# Patient Record
Sex: FEMALE | Race: WHITE | Employment: UNEMPLOYED | URBAN - METROPOLITAN AREA
[De-identification: names, ages, dates, MRNs, and addresses within clinical notes are randomized per-mention and may not be internally consistent; named-entity substitution may affect disease eponyms.]

---

## 2018-01-08 ENCOUNTER — OFFICE VISIT - HEALTHEAST (OUTPATIENT)
Dept: FAMILY MEDICINE | Facility: CLINIC | Age: 28
End: 2018-01-08

## 2018-01-08 DIAGNOSIS — T36.95XA ANTIBIOTIC-INDUCED YEAST INFECTION: ICD-10-CM

## 2018-01-08 DIAGNOSIS — J06.9 VIRAL URI WITH COUGH: ICD-10-CM

## 2018-01-08 DIAGNOSIS — H65.92 LEFT OTITIS MEDIA WITH EFFUSION: ICD-10-CM

## 2018-01-08 DIAGNOSIS — B37.9 ANTIBIOTIC-INDUCED YEAST INFECTION: ICD-10-CM

## 2018-01-31 ENCOUNTER — OFFICE VISIT - HEALTHEAST (OUTPATIENT)
Dept: FAMILY MEDICINE | Facility: CLINIC | Age: 28
End: 2018-01-31

## 2018-01-31 DIAGNOSIS — R07.0 THROAT PAIN: ICD-10-CM

## 2018-01-31 DIAGNOSIS — J06.9 URI (UPPER RESPIRATORY INFECTION): ICD-10-CM

## 2018-01-31 DIAGNOSIS — H69.91 DYSFUNCTION OF RIGHT EUSTACHIAN TUBE: ICD-10-CM

## 2018-01-31 LAB — DEPRECATED S PYO AG THROAT QL EIA: NORMAL

## 2018-02-01 LAB — GROUP A STREP BY PCR: NORMAL

## 2018-03-02 ENCOUNTER — OFFICE VISIT - HEALTHEAST (OUTPATIENT)
Dept: FAMILY MEDICINE | Facility: CLINIC | Age: 28
End: 2018-03-02

## 2018-03-02 DIAGNOSIS — R05.9 COUGH: ICD-10-CM

## 2018-03-02 DIAGNOSIS — R50.9 FEVER: ICD-10-CM

## 2018-03-02 LAB — DEPRECATED S PYO AG THROAT QL EIA: NORMAL

## 2018-03-03 LAB — GROUP A STREP BY PCR: NORMAL

## 2018-04-04 ENCOUNTER — OFFICE VISIT - HEALTHEAST (OUTPATIENT)
Dept: FAMILY MEDICINE | Facility: CLINIC | Age: 28
End: 2018-04-04

## 2018-04-04 DIAGNOSIS — N89.8 VAGINAL DISCHARGE: ICD-10-CM

## 2018-04-04 LAB
ALBUMIN UR-MCNC: NEGATIVE MG/DL
APPEARANCE UR: CLEAR
BILIRUB UR QL STRIP: NEGATIVE
CLUE CELLS: ABNORMAL
COLOR UR AUTO: YELLOW
GLUCOSE UR STRIP-MCNC: NEGATIVE MG/DL
HCG UR QL: NEGATIVE
HGB UR QL STRIP: NEGATIVE
KETONES UR STRIP-MCNC: NEGATIVE MG/DL
LEUKOCYTE ESTERASE UR QL STRIP: NEGATIVE
NITRATE UR QL: NEGATIVE
PH UR STRIP: 7 [PH] (ref 5–8)
SP GR UR STRIP: 1.02 (ref 1–1.03)
SP GR UR STRIP: 1.02 (ref 1–1.03)
TRICHOMONAS, WET PREP: ABNORMAL
UROBILINOGEN UR STRIP-ACNC: NORMAL
YEAST, WET PREP: ABNORMAL

## 2018-09-19 ENCOUNTER — OFFICE VISIT - HEALTHEAST (OUTPATIENT)
Dept: FAMILY MEDICINE | Facility: CLINIC | Age: 28
End: 2018-09-19

## 2018-09-19 DIAGNOSIS — J02.9 SORE THROAT: ICD-10-CM

## 2018-09-19 DIAGNOSIS — N89.8 VAGINAL DISCHARGE: ICD-10-CM

## 2018-09-19 LAB — DEPRECATED S PYO AG THROAT QL EIA: NORMAL

## 2018-09-20 LAB — GROUP A STREP BY PCR: NORMAL

## 2018-10-17 ENCOUNTER — AMBULATORY - HEALTHEAST (OUTPATIENT)
Dept: NURSING | Facility: CLINIC | Age: 28
End: 2018-10-17

## 2018-11-21 ENCOUNTER — OFFICE VISIT - HEALTHEAST (OUTPATIENT)
Dept: FAMILY MEDICINE | Facility: CLINIC | Age: 28
End: 2018-11-21

## 2018-11-21 DIAGNOSIS — R53.83 FATIGUE, UNSPECIFIED TYPE: ICD-10-CM

## 2018-11-21 DIAGNOSIS — R52 GENERALIZED BODY ACHES: ICD-10-CM

## 2018-11-21 DIAGNOSIS — N89.8 VAGINAL DISCHARGE: ICD-10-CM

## 2018-11-21 DIAGNOSIS — B37.31 VAGINAL CANDIDIASIS: ICD-10-CM

## 2018-11-21 LAB
BASOPHILS # BLD AUTO: 0.1 THOU/UL (ref 0–0.2)
BASOPHILS NFR BLD AUTO: 1 % (ref 0–2)
CLUE CELLS: ABNORMAL
EOSINOPHIL # BLD AUTO: 0.2 THOU/UL (ref 0–0.4)
EOSINOPHIL NFR BLD AUTO: 2 % (ref 0–6)
ERYTHROCYTE [DISTWIDTH] IN BLOOD BY AUTOMATED COUNT: 11.4 % (ref 11–14.5)
ERYTHROCYTE [SEDIMENTATION RATE] IN BLOOD BY WESTERGREN METHOD: 8 MM/HR (ref 0–20)
HCT VFR BLD AUTO: 42.6 % (ref 35–47)
HGB BLD-MCNC: 14.7 G/DL (ref 12–16)
LYMPHOCYTES # BLD AUTO: 2.3 THOU/UL (ref 0.8–4.4)
LYMPHOCYTES NFR BLD AUTO: 28 % (ref 20–40)
MCH RBC QN AUTO: 31.2 PG (ref 27–34)
MCHC RBC AUTO-ENTMCNC: 34.4 G/DL (ref 32–36)
MCV RBC AUTO: 91 FL (ref 80–100)
MONOCYTES # BLD AUTO: 0.4 THOU/UL (ref 0–0.9)
MONOCYTES NFR BLD AUTO: 5 % (ref 2–10)
NEUTROPHILS # BLD AUTO: 5.1 THOU/UL (ref 2–7.7)
NEUTROPHILS NFR BLD AUTO: 63 % (ref 50–70)
PLATELET # BLD AUTO: 232 THOU/UL (ref 140–440)
PMV BLD AUTO: 8.1 FL (ref 7–10)
RBC # BLD AUTO: 4.71 MILL/UL (ref 3.8–5.4)
TRICHOMONAS, WET PREP: ABNORMAL
TSH SERPL DL<=0.005 MIU/L-ACNC: 2.09 UIU/ML (ref 0.3–5)
WBC: 8.1 THOU/UL (ref 4–11)
YEAST, WET PREP: ABNORMAL

## 2018-11-22 ENCOUNTER — AMBULATORY - HEALTHEAST (OUTPATIENT)
Dept: FAMILY MEDICINE | Facility: CLINIC | Age: 28
End: 2018-11-22

## 2018-11-22 DIAGNOSIS — E55.9 VITAMIN D DEFICIENCY: ICD-10-CM

## 2018-11-22 LAB
25(OH)D3 SERPL-MCNC: 13.7 NG/ML (ref 30–80)
25(OH)D3 SERPL-MCNC: 13.7 NG/ML (ref 30–80)
B BURGDOR IGG+IGM SER QL: 0.09 INDEX VALUE

## 2019-05-21 ENCOUNTER — OFFICE VISIT - HEALTHEAST (OUTPATIENT)
Dept: FAMILY MEDICINE | Facility: CLINIC | Age: 29
End: 2019-05-21

## 2019-05-21 DIAGNOSIS — N76.0 BV (BACTERIAL VAGINOSIS): ICD-10-CM

## 2019-05-21 DIAGNOSIS — N89.8 VAGINAL DISCHARGE: ICD-10-CM

## 2019-05-21 DIAGNOSIS — R35.0 URINARY FREQUENCY: ICD-10-CM

## 2019-05-21 DIAGNOSIS — B96.89 BV (BACTERIAL VAGINOSIS): ICD-10-CM

## 2019-05-21 LAB
ALBUMIN UR-MCNC: NEGATIVE MG/DL
APPEARANCE UR: CLEAR
BILIRUB UR QL STRIP: NEGATIVE
CLUE CELLS: ABNORMAL
COLOR UR AUTO: YELLOW
GLUCOSE UR STRIP-MCNC: NEGATIVE MG/DL
HGB UR QL STRIP: NEGATIVE
KETONES UR STRIP-MCNC: NEGATIVE MG/DL
LEUKOCYTE ESTERASE UR QL STRIP: NEGATIVE
NITRATE UR QL: NEGATIVE
PH UR STRIP: 5.5 [PH] (ref 5–8)
SP GR UR STRIP: 1.02 (ref 1–1.03)
TRICHOMONAS, WET PREP: ABNORMAL
UROBILINOGEN UR STRIP-ACNC: NORMAL
YEAST, WET PREP: ABNORMAL

## 2019-07-30 ENCOUNTER — COMMUNICATION - HEALTHEAST (OUTPATIENT)
Dept: FAMILY MEDICINE | Facility: CLINIC | Age: 29
End: 2019-07-30

## 2019-07-30 DIAGNOSIS — B37.31 VAGINAL CANDIDIASIS: ICD-10-CM

## 2019-08-07 ENCOUNTER — OFFICE VISIT - HEALTHEAST (OUTPATIENT)
Dept: FAMILY MEDICINE | Facility: CLINIC | Age: 29
End: 2019-08-07

## 2019-08-07 DIAGNOSIS — R30.0 DYSURIA: ICD-10-CM

## 2019-08-07 DIAGNOSIS — M26.609 TMJ (TEMPOROMANDIBULAR JOINT SYNDROME): ICD-10-CM

## 2019-08-07 LAB
ALBUMIN UR-MCNC: NEGATIVE MG/DL
APPEARANCE UR: CLEAR
BILIRUB UR QL STRIP: NEGATIVE
COLOR UR AUTO: YELLOW
GLUCOSE UR STRIP-MCNC: NEGATIVE MG/DL
HGB UR QL STRIP: NEGATIVE
KETONES UR STRIP-MCNC: NEGATIVE MG/DL
LEUKOCYTE ESTERASE UR QL STRIP: NEGATIVE
NITRATE UR QL: NEGATIVE
PH UR STRIP: 6 [PH] (ref 5–8)
SP GR UR STRIP: 1.02 (ref 1–1.03)
UROBILINOGEN UR STRIP-ACNC: NORMAL

## 2019-08-07 ASSESSMENT — MIFFLIN-ST. JEOR: SCORE: 1606.3

## 2019-09-14 ENCOUNTER — OFFICE VISIT - HEALTHEAST (OUTPATIENT)
Dept: FAMILY MEDICINE | Facility: CLINIC | Age: 29
End: 2019-09-14

## 2019-09-14 DIAGNOSIS — N89.8 VAGINAL DISCHARGE: ICD-10-CM

## 2019-09-14 DIAGNOSIS — L02.31 ABSCESS, GLUTEAL CLEFT: ICD-10-CM

## 2019-09-14 LAB
CLUE CELLS: NORMAL
TRICHOMONAS, WET PREP: NORMAL
YEAST, WET PREP: NORMAL

## 2019-09-17 ENCOUNTER — AMBULATORY - HEALTHEAST (OUTPATIENT)
Dept: NURSING | Facility: CLINIC | Age: 29
End: 2019-09-17

## 2019-09-17 DIAGNOSIS — Z23 ENCOUNTER FOR IMMUNIZATION: ICD-10-CM

## 2019-09-19 ENCOUNTER — COMMUNICATION - HEALTHEAST (OUTPATIENT)
Dept: FAMILY MEDICINE | Facility: CLINIC | Age: 29
End: 2019-09-19

## 2019-09-19 DIAGNOSIS — Z87.42 HISTORY OF VAGINITIS: ICD-10-CM

## 2019-09-21 ENCOUNTER — COMMUNICATION - HEALTHEAST (OUTPATIENT)
Dept: SCHEDULING | Facility: CLINIC | Age: 29
End: 2019-09-21

## 2019-11-29 ENCOUNTER — COMMUNICATION - HEALTHEAST (OUTPATIENT)
Dept: FAMILY MEDICINE | Facility: CLINIC | Age: 29
End: 2019-11-29

## 2019-11-29 DIAGNOSIS — L02.31 ABSCESS, GLUTEAL CLEFT: ICD-10-CM

## 2020-01-13 ENCOUNTER — OFFICE VISIT - HEALTHEAST (OUTPATIENT)
Dept: FAMILY MEDICINE | Facility: CLINIC | Age: 30
End: 2020-01-13

## 2020-01-13 DIAGNOSIS — N89.8 VAGINAL DISCHARGE: ICD-10-CM

## 2020-01-13 DIAGNOSIS — R35.0 URINARY FREQUENCY: ICD-10-CM

## 2020-01-13 DIAGNOSIS — R30.0 DYSURIA: ICD-10-CM

## 2020-01-13 LAB
ALBUMIN UR-MCNC: NEGATIVE MG/DL
APPEARANCE UR: CLEAR
BACTERIA #/AREA URNS HPF: ABNORMAL HPF
BILIRUB UR QL STRIP: NEGATIVE
CLUE CELLS: NORMAL
COLOR UR AUTO: YELLOW
GLUCOSE UR STRIP-MCNC: NEGATIVE MG/DL
HGB UR QL STRIP: NEGATIVE
KETONES UR STRIP-MCNC: NEGATIVE MG/DL
LEUKOCYTE ESTERASE UR QL STRIP: NEGATIVE
MUCOUS THREADS #/AREA URNS LPF: ABNORMAL LPF
NITRATE UR QL: NEGATIVE
PH UR STRIP: 5.5 [PH] (ref 5–8)
RBC #/AREA URNS AUTO: ABNORMAL HPF
SP GR UR STRIP: >=1.03 (ref 1–1.03)
SQUAMOUS #/AREA URNS AUTO: ABNORMAL LPF
TRICHOMONAS, WET PREP: NORMAL
UROBILINOGEN UR STRIP-ACNC: ABNORMAL
WBC #/AREA URNS AUTO: ABNORMAL HPF
YEAST, WET PREP: NORMAL

## 2020-03-15 ENCOUNTER — NURSE TRIAGE (OUTPATIENT)
Dept: NURSING | Facility: CLINIC | Age: 30
End: 2020-03-15

## 2020-03-15 NOTE — TELEPHONE ENCOUNTER
Calling, wants to be tested for Coronavirus.  Pt states she has been at the  [employer], lives in [facility] and has been working as a .   Pt has two kids who have a weakened immune system and wants to be checked to be cautious.   RN advised she does not meet criteria for testing, did give [email or web address] as option.   Fever today- 101.5 1 hour ago and 104.5  +Head feels weird, feels like brain freeze, then burning sensation   +Moderate headache  +sore throat  +Stomach pain started today.   +cough    Symptoms began on Friday with a sore throat and cough, today the fever began and the head sensation.   Has been taking ibuprofen  Pt states she is a , when talking to customers she had to stop and had to fix what she was saying.  Denies being confused.   Harder to think or say what she wants to say.   [place] like she had a brain freeze, would last for about 5 minutes, then it would feel like her face was on fire, happening every hour.     Rn recommended going to ED for evaluation, RN recommended having someone drive her due to the head sensations she has been feeling along with fever and sore throat.   Pt does not want to go to ED, states she will [facility] to [facility] and will have someone drive her.    Pt advised to call back if symptoms worsen.     Wanda Herring RN   [facility] RN Triage     Reason for Disposition    Fever > 104 F (40 C)    Fever > 104 F (40 C)    Additional Information    Negative: Difficult to awaken or acting confused (e.g., disoriented, slurred speech)    Negative: [1] Weakness of the face, arm or leg on one side of the body AND [2] new onset    Negative: [1] Numbness of the face, arm or leg on one side of the body AND [2] new onset    Negative: [1] Loss of speech or garbled speech AND [2] new onset    Negative: Passed out (i.e., lost consciousness, collapsed and was not responding)    Negative: Sounds like a life-threatening emergency to the triager    Negative: Followed a head  "injury within last 3 days    Negative: Pregnant    Negative: Traumatic Brain Injury (TBI) is suspected    Negative: Stiff neck (can't touch chin to chest)    Negative: Unable to walk, or can only walk with assistance (e.g., requires support)    Negative: Severe pain in one eye    Negative: [1] Other family members (or roommates) with headaches AND [2] possibility of carbon monoxide exposure    Negative: [1] SEVERE headache (e.g., excruciating) AND [2] \"worst headache\" of life    Negative: [1] SEVERE headache AND [2] sudden-onset (i.e., reaching maximum intensity within seconds)    Negative: [1] SEVERE headache AND [2] fever    Negative: Loss of vision or double vision (Exception: same as prior migraines)    Negative: [1] Fever > 100.0 F (37.8 C) AND [2] diabetes mellitus or weak immune system (e.g., HIV positive, cancer chemo, splenectomy, chronic steroids)    Negative: Patient sounds very sick or weak to the triager    Negative: [1] Vomiting AND [2] 2 or more times (Exception: similar to previous migraines)    Negative: [1] SEVERE headache (e.g., excruciating) AND [2] not improved after 2 hours of pain medicine    Negative: Severe difficulty breathing (e.g., struggling for each breath, speaks in single words, stridor)    Negative: Sounds like a life-threatening emergency to the triager    Negative: [1] Diagnosed strep throat AND [2] taking antibiotic AND [3] symptoms continue    Negative: Throat culture results, call about    Negative: Productive cough is main symptom    Negative: Non-productive cough is main symptom    Negative: Hoarseness is main symptom    Negative: Runny nose is main symptom    Negative: [1] Drooling or spitting out saliva (because can't swallow) AND [2] normal breathing    Negative: Unable to open mouth completely    Negative: [1] Difficulty breathing AND [2] not severe    Protocols used: HEADACHE-A-AH, SORE THROAT-A-AH      "

## 2021-05-29 ENCOUNTER — RECORDS - HEALTHEAST (OUTPATIENT)
Dept: ADMINISTRATIVE | Facility: CLINIC | Age: 31
End: 2021-05-29

## 2021-05-29 NOTE — PROGRESS NOTES
Chief Complaint   Patient presents with     vaginal itching intermittant and foul odor      urinary frequency  sx's x a few weeks          HPI      Patient is here for a few weeks of vaginal discharge with foul odor, and intermittent vaginal itching. She also reported a few days of increased urinary frequency without dysuria nor gross hematuria. No fever, chills, abdominal pain. No concern for STIs.       ROS: Pertinent ROS noted in HPI.     Allergies   Allergen Reactions     Other Environmental Allergy      Adhesive tape     Adhesive Rash     Reglan [Metoclopramide Hcl] Anxiety     Pt dislikes this medication and how it makes her feel. Felt weird, legs were heavy.       Patient Active Problem List   Diagnosis     Allergies     Constipation     Open Wound Of The Lip     Threatened miscarriage in early pregnancy     Pregnant     Left otitis media     Acute colitis     Tobacco use       Family History   Problem Relation Age of Onset     Ovarian cysts Sister        Social History     Socioeconomic History     Marital status: Single     Spouse name: Not on file     Number of children: Not on file     Years of education: Not on file     Highest education level: Not on file   Occupational History     Not on file   Social Needs     Financial resource strain: Not on file     Food insecurity:     Worry: Not on file     Inability: Not on file     Transportation needs:     Medical: Not on file     Non-medical: Not on file   Tobacco Use     Smoking status: Current Every Day Smoker     Packs/day: 0.50     Years: 7.00     Pack years: 3.50     Types: Cigarettes     Smokeless tobacco: Current User   Substance and Sexual Activity     Alcohol use: No     Drug use: No     Sexual activity: Never     Partners: Female     Birth control/protection: Abstinence   Lifestyle     Physical activity:     Days per week: Not on file     Minutes per session: Not on file     Stress: Not on file   Relationships     Social connections:     Talks on  phone: Not on file     Gets together: Not on file     Attends Zoroastrianism service: Not on file     Active member of club or organization: Not on file     Attends meetings of clubs or organizations: Not on file     Relationship status: Not on file     Intimate partner violence:     Fear of current or ex partner: Not on file     Emotionally abused: Not on file     Physically abused: Not on file     Forced sexual activity: Not on file   Other Topics Concern     Not on file   Social History Narrative     Not on file           Objective:    Vitals:    05/21/19 1653   BP: 120/66   Pulse: 61   Resp: 20   Temp: 98.3  F (36.8  C)   SpO2: 98%       Gen:NAD  CV: RRR, normal S1S2, no M, R, G  Pulm: CTAB, normal effort  Abd: normal inspection, normal bowel sounds, soft, no pain, no mass/HSM  MSK: normal palpation of back, no CVA tenderness.  : deferred per patient request. She asked for self swab.     Recent Results (from the past 24 hour(s))   Urinalysis-UC if Indicated   Result Value Ref Range    Color, UA Yellow Colorless, Yellow, Straw, Light Yellow    Clarity, UA Clear Clear    Glucose, UA Negative Negative    Bilirubin, UA Negative Negative    Ketones, UA Negative Negative    Specific Gravity, UA 1.020 1.005 - 1.030    Blood, UA Negative Negative    pH, UA 5.5 5.0 - 8.0    Protein, UA Negative Negative mg/dL    Urobilinogen, UA 1.0 E.U./dL 0.2 E.U./dL, 1.0 E.U./dL    Nitrite, UA Negative Negative    Leukocytes, UA Negative Negative   Wet Prep, Vaginal   Result Value Ref Range    Yeast Result No yeast seen No yeast seen    Trichomonas No Trichomonas seen No Trichomonas seen    Clue Cells, Wet Prep Clue cells seen (!) No Clue cells seen           BV (bacterial vaginosis)  -     metroNIDAZOLE (FLAGYL) 500 MG tablet; Take 1 tablet (500 mg total) by mouth 2 (two) times a day for 7 days.    Urinary frequency  -     Urinalysis-UC if Indicated    Vaginal discharge  -     Wet Prep, Vaginal

## 2021-05-30 ENCOUNTER — RECORDS - HEALTHEAST (OUTPATIENT)
Dept: ADMINISTRATIVE | Facility: CLINIC | Age: 31
End: 2021-05-30

## 2021-05-30 NOTE — TELEPHONE ENCOUNTER
RN cannot approve Refill Request    RN can NOT refill this medication med is not covered by policy/route to provider.    Luigi Stubbs, [facility] Triage/Med Refill 7/30/2019    Requested Prescriptions   Pending Prescriptions Disp Refills     fluconazole (DIFLUCAN) 150 MG tablet [Pharmacy Med Name: FLUCONAZOLE 150MG TABLETS] 2 tablet 0     Sig: TAKE 1 TABLET(150 MG) BY MOUTH 1 TIME FOR 1 DOSE. MAY REPEAT IN 72 HOURS IF SYMPTOMS PERSIST       There is no refill protocol information for this order

## 2021-05-31 VITALS — WEIGHT: 200 LBS | BODY MASS INDEX: 34.33 KG/M2

## 2021-06-01 VITALS — WEIGHT: 195 LBS | BODY MASS INDEX: 33.47 KG/M2

## 2021-06-01 VITALS — BODY MASS INDEX: 33.64 KG/M2 | WEIGHT: 196 LBS

## 2021-06-01 VITALS — WEIGHT: 198 LBS | BODY MASS INDEX: 33.99 KG/M2

## 2021-06-01 NOTE — TELEPHONE ENCOUNTER
Per Dr Holt patient is to still wait 72 hour before 2nd dose of diflucan. Called and informed patient. She agreed to finish meds, take diflucan after 72 hours as instructed and informed to establish care for any issues after finishing course of meds per Dr Holt

## 2021-06-01 NOTE — TELEPHONE ENCOUNTER
Medication Question or Clarification  Who is calling: Pharmacy: [facility]  What medication are you calling about? (include dose and sig)   ibuprofen (ADVIL,MOTRIN) 600 MG tablet 40 tablet 0 2019     Si tablet every 6-8 hours as needed for pain.  Take with food.    Who prescribed the medication?: Carole Marks CNP  What is your question/concern?: pharmacy states patient also have ibuprofen 800 mg tablets . Take one tablet by mouth every 8 hours as needed for pain . Prescribed by Her Leena . Pharmacy needs clarification which prescription they should refill . Please advise .   Pharmacy: [facility]   Okay to leave a detailed message?: No  Site CMT - Please call the pharmacy to obtain any additional needed information.

## 2021-06-01 NOTE — TELEPHONE ENCOUNTER
Triage call:   Seen last Saturday in [facility]- vaginitis and cyst- started on Cephalexin    Called 9/19/19 and was prescribed diflucan for a yeast infection. States that she stopped taking the cephalexin yesterday as well - made decision to stop taking on her own as she was having redness/itching.     Patient is wondering what she should do- advised that stopping an antibiotic without provider permission is not advised. Patient is wondering if she should wait to take her second dose of diflucan after she finishes the antibiotic since she is restarting the antibiotic today.     Provider please advise.     Lia Molina RN [facility] Triage/Med Refill 9/21/2019 3:16 PM     Reason for Disposition    Caller has NON-URGENT medication question about med that PCP prescribed and triager unable to answer question    Protocols used: MEDICATION QUESTION CALL-A-

## 2021-06-01 NOTE — TELEPHONE ENCOUNTER
Medication Request  Medication name:   Fluconazole  Pharmacy Name and Location:   [id number]  Reason for request:   Patient is having symptoms of yeast infection due to being on antibiotic.  When did you use medication last?:    Unknown  Patient offered appointment:  No, patient was just seen at [facility], 9/14/2019  Okay to leave a detailed message: yes

## 2021-06-01 NOTE — TELEPHONE ENCOUNTER
Patient notified of information as per Dr Holt. Patient verbalized understanding and thanked for the assistance and return call.

## 2021-06-02 ENCOUNTER — RECORDS - HEALTHEAST (OUTPATIENT)
Dept: ADMINISTRATIVE | Facility: CLINIC | Age: 31
End: 2021-06-02

## 2021-06-02 VITALS — BODY MASS INDEX: 34.38 KG/M2 | WEIGHT: 200.3 LBS

## 2021-06-02 VITALS — WEIGHT: 199 LBS | BODY MASS INDEX: 34.16 KG/M2

## 2021-06-03 VITALS — WEIGHT: 198.7 LBS | HEIGHT: 64 IN | BODY MASS INDEX: 33.92 KG/M2

## 2021-06-03 VITALS
SYSTOLIC BLOOD PRESSURE: 137 MMHG | TEMPERATURE: 98.3 F | HEART RATE: 79 BPM | RESPIRATION RATE: 16 BRPM | DIASTOLIC BLOOD PRESSURE: 83 MMHG | WEIGHT: 186 LBS | OXYGEN SATURATION: 97 % | BODY MASS INDEX: 31.93 KG/M2

## 2021-06-03 VITALS — BODY MASS INDEX: 33.83 KG/M2 | WEIGHT: 197.1 LBS

## 2021-06-03 NOTE — TELEPHONE ENCOUNTER
RN cannot approve Refill Request    RN can NOT refill this medication med is not covered by policy/route to provider. Last office visit: 9/14/2019 Carole Marks CNP Last Physical: Visit date not found Last MTM visit: Visit date not found Last visit same specialty: 9/14/2019 Carole Marks CNP.  Next visit within 3 mo: Visit date not found  Next physical within 3 mo: Visit date not found      Shireen Jin, [facility] Triage/Med Refill 11/29/2019    Requested Prescriptions   Pending Prescriptions Disp Refills     ibuprofen (ADVIL,MOTRIN) 600 MG tablet [Pharmacy Med Name: IBUPROFEN 600MG TABLETS] 40 tablet 0     Sig: TAKE 1 TABLET BY MOUTH EVERY 6-8 HOURS AS NEEDED FOR PAIN. TAKE WITH FOOD       There is no refill protocol information for this order

## 2021-06-04 VITALS
RESPIRATION RATE: 14 BRPM | WEIGHT: 171.4 LBS | TEMPERATURE: 98.2 F | BODY MASS INDEX: 29.42 KG/M2 | SYSTOLIC BLOOD PRESSURE: 132 MMHG | OXYGEN SATURATION: 100 % | DIASTOLIC BLOOD PRESSURE: 78 MMHG | HEART RATE: 76 BPM

## 2021-06-12 ENCOUNTER — HEALTH MAINTENANCE LETTER (OUTPATIENT)
Age: 31
End: 2021-06-12

## 2021-06-15 NOTE — PROGRESS NOTES
Chief Complaint   Patient presents with     Sore Throat     x 3 days. with ear pain.          HPI    Patient is here for 3 days of moderate sore throat and bilateral ear pain, along with a cough. No fever, chest pain, shortness of breath.     ROS: Pertinent ROS noted in HPI.     Allergies   Allergen Reactions     Other Environmental Allergy      Adhesive tape     Adhesive Rash     Reglan [Metoclopramide Hcl] Anxiety     Pt dislikes this medication and how it makes her feel. Felt weird, legs were heavy.       Patient Active Problem List   Diagnosis     Allergies     Constipation     Open Wound Of The Lip     Threatened miscarriage in early pregnancy     Pregnant     Left otitis media     Acute colitis     Tobacco use       Family History   Problem Relation Age of Onset     Ovarian cysts Sister        Social History     Social History     Marital status: Single     Spouse name: N/A     Number of children: N/A     Years of education: N/A     Occupational History     Not on file.     Social History Main Topics     Smoking status: Current Every Day Smoker     Packs/day: 0.50     Years: 7.00     Types: Cigarettes     Smokeless tobacco: Not on file     Alcohol use No     Drug use: No     Sexual activity: No     Other Topics Concern     Not on file     Social History Narrative         Objective:    Vitals:    01/31/18 1028   Pulse: 76   Resp: 12   Temp: 98.3  F (36.8  C)   SpO2: 98%       Gen:NAD  Oropharynx: throat clear without lesions, erythema, edema. Normal oral mucosa  Ears: R TM with small effusion without erythema. L TM normal. Ear canals normal with minimal cerumen.   Nose: no discharge  Neck:NAD  CV: RRR, no M, R, G  Pulm: CTAB, normal effort      Recent Results (from the past 24 hour(s))   Rapid Strep A Screen-Throat   Result Value Ref Range    Rapid Strep A Antigen No Group A Strep detected, presumptive negative No Group A Strep detected, presumptive negative       URI (upper respiratory infection)  -      benzonatate (TESSALON) 200 MG capsule; Take 1 capsule (200 mg total) by mouth 3 (three) times a day as needed for cough.    Dysfunction of right eustachian tube  -     fluticasone (FLONASE) 50 mcg/actuation nasal spray; 2 sprays into each nostril daily.    Throat pain  -     Rapid Strep A Screen-Throat  -     Group A Strep, RNA Direct Detection, Throat

## 2021-06-15 NOTE — PROGRESS NOTES
Subjective:      Cynthia Miner is a 27 y.o. female here for evaluation of bilateral ear pain x 2 days, pain worse int the left ear. Left ear feels clogged with muffled hearing. No fevers, afebrile in clinic, no OTC meds given. Has had some runny nose, cough and congestion in the past 24 hours as well. Denies sore throat or pain with swallowing, no N/V/D, stomach ache, appetite and fluid intake unchanged.  Children[facility] with strep and influenza      Objective:     Vitals:    01/08/18 1136   BP: 106/74   Pulse: 73   Resp: 14   Temp: 98.4  F (36.9  C)   SpO2: 98%       General: Alert, interactive, NAD, cooperative on exam  Eyes: PERRLA, EOMI, conjunctivae clear.   Ears: Right TM; pink and translucent. Left TM; erythematous and dull appearing with cloudy fluid  Nose:  Nasal mucosa erythema and inflammation. Clear mucus . No maxillary or frontal sinus tenderness  Mouth/Throat:  Tonsils clear. Uvula midline. Posterior pharynx erythematous.  Mucus membranes pink and moist, free of lesions.  Neck: Supple, symmetrical, trachea midline, no adenopathy  Lungs: CTA bilaterally, good air movement throughout. No rales, rhonchi or wheezing  Heart:: Regular rate and rhythm, S1, S2 normal, no murmur, click, rub or gallop      Assessment/Plan:     1. Left otitis media with effusion  amoxicillin (AMOXIL) 875 MG tablet   2. Antibiotic-induced yeast infection  fluconazole (DIFLUCAN) 150 MG tablet   3. Viral URI with cough       I reviewed exam findings with patient.  Amoxicillin given for ear infection. Tylenol or Ibuprofen prnf for fever/discomfort.  Recommended supportive cares for URI symptoms; nasal saline, elevating hob, humidified air. Advised plenty of fluids and rest.  If symptoms not improved in next 5-7 days, f/u with PCP, sooner if worsening. Patient verbalized understanding and agrees with plan of care.   Patient given Diflucan for hx of antibiotic induced yeast infection.     -Patient instructions given.

## 2021-06-16 NOTE — PROGRESS NOTES
Assessment/Plan:   Cough/ST/Fever  Exposure to strep  2 day history of ST and cough, HA, fatifue, feeling feverish.  Exposed to strep.  RST negative.  Likely viral illness.     - Rapid Strep A Screen-Throat  - Group A Strep, RNA Direct Detection, Throat    Fluids, rest, steam, nasal saline if congested  Cough drops  Strep confirmation test pending  Follow up if worse or no better.     Subjective:      Cynthia Miner is a 27 y.o. female who presents with cough and fever and ST.  2 nights ago she developed ST followed by cough.  No wheeze or SOB, no URI.  No N/V/D.  She has had HA, fatigue and felt feverish today.  No myalgia.  Daughter diagnosed with strep this week - had been diagnosed with bronchitis last week.  She wants to make sure she doesn't have strep.  No rash.     Allergies   Allergen Reactions     Other Environmental Allergy      Adhesive tape     Adhesive Rash     Reglan [Metoclopramide Hcl] Anxiety     Pt dislikes this medication and how it makes her feel. Felt weird, legs were heavy.     Current Outpatient Prescriptions on File Prior to Visit   Medication Sig Dispense Refill     acetaminophen (TYLENOL) 500 MG tablet Take 1,000 mg by mouth every 6 (six) hours as needed.       benzonatate (TESSALON) 200 MG capsule Take 1 capsule (200 mg total) by mouth 3 (three) times a day as needed for cough. 30 capsule 0     fluticasone (FLONASE) 50 mcg/actuation nasal spray 2 sprays into each nostril daily. 16 g 0     No current facility-administered medications on file prior to visit.      Patient Active Problem List   Diagnosis     Allergies     Constipation     Open Wound Of The Lip     Threatened miscarriage in early pregnancy     Pregnant     Left otitis media     Acute colitis     Tobacco use       Objective:     /52  Pulse 84  Temp 97  F (36.1  C) (Oral)   Wt 196 lb (88.9 kg)  SpO2 100%  BMI 33.64 kg/m2    Physical  General Appearance: Alert, cooperative, no distress  Head: Normocephalic, without  obvious abnormality, atraumatic  Eyes: Conjunctivae are normal  Ears: Normal TMs and external ear canals, both ears  Nose: No significant congestion.  Throat: Throat is normal.  No exudate.  No significant lesions  Neck: No adenopathy  Lungs: Clear to auscultation bilaterally, respirations unlabored  Heart: Regular rate and rhythm  Skin: no rashes or lesions  Psychiatric: Patient has a normal mood and affect.        Recent Results (from the past 24 hour(s))   Rapid Strep A Screen-Throat   Result Value Ref Range    Rapid Strep A Antigen No Group A Strep detected, presumptive negative No Group A Strep detected, presumptive negative

## 2021-06-17 NOTE — PROGRESS NOTES
"Chief Complaint:    Chief Complaint   Patient presents with     poss BV or yeast infection     x2days bad odor    .    HPI:  Cynthia Miner is a 27 y.o. female who presents with a few day history of white milky vaginal discharge that smells after she urinates.  She denies current sexual intercourse, stating \"it has been a long\".  She indicated that she had her tubes ties. PERTINENT NEGATIVES INCLUDE: NO BELLY PAIN, VAGINAL ITCHING, OR SORENESS, DSYURIA OR FREQUENCY, NOT SEXUALLY ACTIVE, NO BACK PAIN OR FLANK PAIN.  PERTINENT POSITIVE INCLUDE HX OF LEEP.    PT CURRENTLY DENIES TAKING ANY MEDICATIONS EXCEPT TYLENOL AND MELATONIN(FOR SLEEP otc)      Additional Review of Systems: Refer to HPI for pertinent findings.     Past Medical History:   Diagnosis Date     Abnormal Pap smear of cervix 2012    LEEP procedure     Allergies     Created by Conversion      ASCUS (atypical squamous cells of undetermined significance) on Pap smear      High-risk pregnancy supervision      Hypertension      Obesity (BMI 30-39.9)      Short cervix        Past Surgical History:   Procedure Laterality Date     CERVICAL BIOPSY  W/ LOOP ELECTRODE EXCISION       FOOT SURGERY Bilateral 2015    Removed bone tissue on lateral side of both feet     WISDOM TOOTH EXTRACTION     TUBAL LIGATION    Current Outpatient Prescriptions   Medication Sig Dispense Refill     acetaminophen (TYLENOL) 500 MG tablet Take 1,000 mg by mouth every 6 (six) hours as needed.       benzonatate (TESSALON) 200 MG capsule Take 1 capsule (200 mg total) by mouth 3 (three) times a day as needed for cough. 30 capsule 0     fluticasone (FLONASE) 50 mcg/actuation nasal spray 2 sprays into each nostril daily. 16 g 0     No current facility-administered medications for this visit.        Allergies as of 04/04/2018 - [place] as Reviewed 03/02/2018   Allergen Reaction Noted     Other environmental allergy  05/21/2015     Adhesive Rash 07/01/2015     Reglan [metoclopramide hcl] Anxiety " 01/06/2016         Family History   Problem Relation Age of Onset     Ovarian cysts Sister        Social History   Substance Use Topics     Smoking status: Current Every Day Smoker     Packs/day: 0.50     Years: 7.00     Types: Cigarettes     Smokeless tobacco: Current User     Alcohol use No          Exam:  /60  Pulse 73  Temp 98  F (36.7  C) (Oral)   Resp 15  Wt 195 lb (88.5 kg)  SpO2 97%  Breastfeeding? No  BMI 33.47 kg/m2  Gen: NAD   HEENT: PERRLA  NECK: SUPPLE, NO LN   THROAT: CLEAR  PULM: CTA B  CV: R/R/R/, NO M/C/R/  ABDOMEN: SOF, NT, ND +BS  EXT: WNL  PSYCH ORIENTED X 3  Cn: II-XII grossly intact          ASSESSMENT/PLAN:    Cynthia was seen today for poss bv or yeast infection.    Diagnoses and all orders for this visit:    Vaginal discharge  -     Pregnancy, Urine  -     Urinalysis-UC if Indicated  -     Wet Prep, Vaginal        Call or return to clinic prn if these symptoms worsen or fail to improve as anticipated.    Mary Norwood MD  [facility]

## 2021-06-17 NOTE — PATIENT INSTRUCTIONS - HE
Patient Instructions by Jose Jarvis PA-C at 1/13/2020  3:40 PM     Author: Jose Jarvis PA-C Service: -- Author Type: Physician Assistant    Filed: 1/13/2020  4:38 PM Encounter Date: 1/13/2020 Status: Signed    : Jose Jarvis PA-C (Physician Assistant)       Increased fluids and rest.  Discussed signs and symptoms of ascending urinary tract infection symptoms to include pyelonephritis. Instructed to turn to clinic if there are increased fever chills night sweats fatigue abdominal pain or flank pain  Antibiotic as written. Risks and benefits of medication discussed.  Indication for return to clinic was gone over.  Patient was instructed to return to clinic if she is not getting good resolution of the symptoms or if any complication or new concerns arise.    As a result of our visit today, here are the action plans for you:    1. Medication(s) to stop: There are no discontinued medications.    2. Medication(s) to start or change:   Medications Ordered   Medications   ? nitrofurantoin, macrocrystal-monohydrate, (MACROBID) 100 MG capsule     Sig: Take 1 capsule (100 mg total) by mouth 2 (two) times a day for 7 days.     Dispense:  14 capsule     Refill:  0       3. Other instructions: Yes      Urinary Tract Infections in Women    Urinary tract infections (UTIs) are most often caused by bacteria (germs). These bacteria enter the urinary tract. The bacteria may come from outside the body. Or they may travel from the skin outside the rectum or vagina into the urethra. Female anatomy makes it easier for bacteria from the bowel to enter a womans urinary tract, which is the most common source of UTI. This means women develop UTIs more often than men. Pain in or around the urinary tract is a common UTI symptom. But the only way to know for sure if you have a UTI for the health care provider to test your urine. The two tests that may be done are the urinalysis and urine culture.  Types of UTIs    Cystitis: A bladder  infection (cystitis) is the most common UTI in women. You may have urgent or frequent urination. You may also have pain, burning when you urinate, and bloody urine.    Urethritis: This is an inflamed urethra, which is the tube that carries urine from the bladder to outside the body. You may have lower stomach or back pain. You may also have urgent or frequent urination.    Pyelonephritis: This is a kidney infection. If not treated, it can be serious and damage your kidneys. In severe cases, you may be hospitalized. You may have a fever and lower back pain.  Medications to treat a UTI  Most UTIs are treated with antibiotics. These kill the bacteria. The length of time you need to take them depends on the type of infection. It may be as short as 3 days. If you have repeated UTIs, a low-dose antibiotic may be needed for several months. Take antibiotics exactly as directed. Dont stop taking them until all of the medication is gone. If you stop taking the antibiotic too soon, the infection may not go away, and you may develop a resistance to the antibiotic. This can make it much harder to treat.  Lifestyle changes to treat and prevent UTIs  The lifestyle changes below will help get rid of your UTI. They may also help prevent future UTIs.    Drink plenty of fluids. This includes water, juice, or other caffeine-free drinks. Fluids help flush bacteria out of your body.    Empty your bladder. Always empty your bladder when you feel the urge to urinate. And always urinate before going to sleep. Urine that stays in your bladder can lead to infection. Try to urinate before and after sex as well.    Practice good personal hygiene. Wipe yourself from front to back after using the toilet. This helps keep bacteria from getting into the urethra.    Use condoms during sex. These help prevent UTIs caused by sexually transmitted bacteria. Also, avoid using spermicides during sex. These can increase the risk of UTIs. Choose other forms  of birth control instead. For women who tend to get UTIs after sex, a low-dose of a preventive antibiotic may be used. Be sure to discuss this option with your health care provider.    Follow up with your health care provider as directed. He or she may test to make sure the infection has cleared. If necessary, additional treatment may be started.  Date Last Reviewed: 9/8/2014 2000-2016 [employer]. [address] [place] [address], [place] [address]. All rights reserved. This information is not intended as a substitute for professional medical care. Always follow your healthcare professional's instructions.

## 2021-06-17 NOTE — PATIENT INSTRUCTIONS - HE
Patient Instructions by David Burgess MD at 2019  4:40 PM     Author: David Burgess MD Service: -- Author Type: Physician    Filed: 2019  5:50 PM Encounter Date: 2019 Status: Signed    : David Burgess MD (Physician)         Patient Education     Bacterial Vaginosis    You have a vaginal infection called bacterial vaginosis (BV). Both good and bad bacteria are present in a healthy vagina. BV occurs when these bacteria get out of balance. The number of bad bacteria increase. And the number of good bacteria decrease. Although BV is associated with sexual activity, it is not a sexually transmitted disease.  BV may or may not cause symptoms. If symptoms do occur, they can include:    Thin, gray, milky-white, or sometimes green discharge    Unpleasant odor or fishy smell    Itching, burning, or pain in or around the vagina  It is not known what causes BV, but certain factors can make the problem more likely. This can include:    Douching    Having sex with a new partner    Having sex with more than one partner  BV will sometimes go away on its own. But treatment is usually recommended. This is because untreated BV can increase the risk of more serious health problems such as:    Pelvic inflammatory disease (PID)     delivery (giving birth to a baby early if youre pregnant)    HIV and certain other sexually transmitted diseases (STDs)    Infection after surgery on the reproductive organs  Home care  General care    BV is most often treated with medicines called antibiotics. These may be given as pills or as a vaginal cream. If antibiotics are prescribed, be sure to use them exactly as directed. Also, be sure to complete all of the medicine, even if your symptoms go away.    Don't douche or having sex during treatment.    If you have sex with a female partner, ask your healthcare provider if she should also be treated.  Prevention    Don't douche.    Don't have sex. If you do have  sex, then take steps to lower your risk:  ? Use condoms when having sex.  ? Limit the number of sexual partners you have.  Follow-up care  Follow up with your healthcare provider, or as advised.  When to seek medical advice  Call your healthcare provider right away if:    You have a fever of 100.4 F (38 C) or higher, or as directed by your provider.    Your symptoms worsen, or they dont go away within a few days of starting treatment.    You have new pain in the lower belly or pelvic region.    You have side effects that bother you or a reaction to the pills or cream youre prescribed.    You or any partners you have sex with have new symptoms, such as a rash, joint pain, or sores.  Date Last Reviewed: 10/1/2017    0112-7823 [employer]. [address] [place] [address], [place] [address]. All rights reserved. This information is not intended as a substitute for professional medical care. Always follow your healthcare professional's instructions.

## 2021-06-17 NOTE — PATIENT INSTRUCTIONS - HE
Patient Instructions by Carole Marks CNP at 9/14/2019  3:40 PM     Author: Carole Marks CNP Service: -- Author Type: Nurse Practitioner    Filed: 9/14/2019  4:48 PM Encounter Date: 9/14/2019 Status: Addendum    : Carole Marks CNP (Nurse Practitioner)    Related Notes: Original Note by Carole Marks CNP (Nurse Practitioner) filed at 9/14/2019  4:47 PM       Take a clean washcloth and soak it in warm water. Apply the warm compress to the cyst at least four times a day for at least 10 minutes.  Alternatively, you can soak in a warm tub.  See handout for instructions on this.      Avoid thong underwear or other things that were likely to irritate this area.    Cephalexin for possible infection in painful area.      Ibuprofen 600 mg 4 times daily.    If you are not feeling much better in around 4 days or cyst is obviously getting larger or pain is intolerable, please follow-up with primary care.    If cephalexin and warm washcloths are working and pain subsides, no need for further follow-up.    Wet prep today did not show BV, yeast or trichomonas.  Please follow-up if a problematic discharge is still present after your next menstrual cycle.    Patient Education     Pilonidal Cyst, Infected (Antibiotic Treatment)  A pilonidal cyst is a swelling that starts under the skin on the sacrum near the tailbone. It may look like a small dimple. It can fill with skin oils, hair, and dead skin cells. It may stay small or grow larger. It may become infected with normal skin bacteria because it often has an opening to the surface.  Causes  The cause of pilonidal cysts has been debated since they were first recognized. A cyst may be present at birth and go unnoticed. Injury, rubbing, or skin irritation may also cause pilonidal cysts. It can also be caused by an ingrown hair. The cause is most likely a combination of these things. Because some injury or irritation can lead to pilonidal cysts, they can be more  common in people who sit or drive a lot for work.  Symptoms  A pilonidal cyst may be small and painless. If it becomes inflamed or infected, you may have these symptoms:    Swelling    Irritation or redness    Pain    Drainage  The cyst can swell and drain on its own. The swelling and drainage can come and go.  Treatment  A limited infection can be treated with antibiotics and home care. You have been given antibiotics to treat your infected pilonidal cyst.  Home care  The following guidelines will help you care for your wound at home:    Sit in a tub filled with about 6 inches of hot water. Keep the water hot for 10 to 15 minutes.    Don't squeeze the pilonidal cyst or stick a needle in it to drain it. This will make the infection worse, or spread it.    Cover the cyst with a pad or something similar to keep it from becoming more irritated, damaged, and painful.  Medicines    Take acetaminophen or ibuprofen for pain, unless you were given a different pain medicine to use. Talk with your doctor before using these medicines if you have chronic liver or kidney disease, or have ever had a stomach ulcer or digestive bleeding. Also talk with your doctor if you are taking blood thinner medicines.    Take the antibiotics that you were prescribed until they are all gone. To make sure the infection is cured, it is important to finish the antibiotics even if the wound looks better.    Use antibiotic cream or ointment if your healthcare provider tells you to.    Preventing future infections  Once this infection has healed, follow these tips to lower the risk for another infection:    Keep the area of the cyst clean by bathing or showering every day.    Don't wear tight-fitting clothing. This will help lessen sweat and irritation of the skin.    You may need surgery to completely remove the cyst if it keeps coming back. The surgery can only be done when the cyst is not infected. Ask your doctor for more  information.    Watch for signs of infection listed below so that treatment may be started early.  Follow-up care  Follow up with your healthcare provider, or as advised. Check your wound every day for the signs listed below.  When to seek medical advice  Call your healthcare provider right away if any of these occur:    Pus coming from the cyst    Increasing local pain, redness, or swelling    Fever of 100.4 F (38.0 C) or higher for more than 2 days, or as directed by your healthcare provider  Date Last Reviewed: 12/1/2016 2000-2017 [employer]. [address] [place] [address]. All rights reserved. This information is not intended as a substitute for professional medical care. Always follow your healthcare professional's instructions.

## 2021-06-17 NOTE — PATIENT INSTRUCTIONS - HE
Patient Instructions by Nimisha Covignton PA-C at 8/7/2019  4:30 PM     Author: Nimisha Covington PA-C Service: -- Author Type: Physician Assistant    Filed: 8/7/2019  5:58 PM Encounter Date: 8/7/2019 Status: Signed    : Nimisha Covington PA-C (Physician Assistant)       Patient Education     TMJ Syndrome  The temporomandibular joint (TMJ) is the joint that connects your lower jaw to your head. You can feel it in front of your ears when you open and close your mouth. TMJ disorders involve chronic or recurrent pain in the joint. When treated, symptoms of TMJ disorders usually go away within a few months.  Causes  There is no widely agreed-on cause of TMJ disorders. They have been linked to injury, arthritis, chronic fatigue syndrome, and fibromyalgia. A definite connection has not been shown, though.  Symptoms    Pain in the face, jaw, or neck    Pain with jaw movement or chewing    Locking or catching sensation of the jaw    Clicking, popping, or grinding sounds with movement of the TMJ    Headache    Ear pain  Home care  Modest, nonsurgical treatments are a good first step toward relieving symptoms. Try the approaches described below.    Rest the jaw by avoiding crunchy or hard-to-chew foods. Dont eat hard or sticky candies. Soft foods and liquids are easier on the jaw.    Protect your jaw while yawning. If you need to yawn, put your fist under your chin to prevent your mouth from opening up too wide.    To help relieve pain, try applying hot or cold packs to the painful area. Try both hot and cold to find out which works best for you. To make a cold pack, put ice cubes in a plastic bag that seals at the top. Wrap the bag in a clean, thin towel or cloth. Never put ice or an ice pack directly on the skin. If you use hot packs (small towels soaked in hot water), be careful not to burn yourself.    You may take acetaminophen or ibuprofen for pain, unless you were given a different pain medicine. (Note: If  you have chronic liver or kidney disease or have ever had a stomach ulcer or gastrointestinal bleeding, talk with your healthcare provider before using these medicines. Also talk to your provider if you are taking medicine to prevent blood clots.) Dont give aspirin to a child younger than age 19 unless directed by the chris provider. Taking aspirin can put a child at risk for Reye syndrome. This is a rare but very serious disorder that most often affects the brain and the liver.  Reducing stress  If stress seems to be contributing to your symptoms, try to identify the sources of stress in your life. These arent always obvious. Common stressors include:    Everyday hassles. These include things such as traffic jams, missed appointments, or car trouble.    Major life changes. These can be good, such as a new baby or job promotion. And they can be bad, such as losing a job or losing a loved one.    Overload. The feeling that you have too many responsibilities and can't take care of everything at once.    Helplessness. Feeling like your problems are more than you can solve.  When possible, do something about your sources of stress. See if you can avoid hassles, limit the amount of change in your life at one time, and take breaks when you feel overloaded.  Unfortunately, many stressful situations cannot be avoided. So learning how to manage stress better is very important. Getting regular exercise, eating nutritious, balanced meals, and getting adequate rest all help to make everyday stress more manageable. Certain techniques are also helpful: relaxation and breathing exercises, visualization, biofeedback, meditation, or simply taking some time out to clear your mind. For more information, talk with your healthcare provider.  Follow-up care  Follow up with your healthcare provider, or as advised. Further testing and additional treatment may be required. If changes to your lifestyle do not improve your symptoms, talk  with your healthcare provider about other available therapies. These include bite guards for help with teeth grinding, stress management techniques, and more. If stress is an important factor and does not respond to the above simple measures, talk with your healthcare provider about a referral for stress management.  If X-rays were done, they will be reviewed by a specialist. You will be notified of the results, especially if they affect treatment.  Call 911  Call 911 if any of these occur:    Trouble breathing or swallowing, wheezing    Confusion    Extreme drowsiness or trouble awakening    Fainting or loss of consciousness    Rapid heart rate  When to seek medical advice  Call your healthcare provider right away if any of these occur:    Swollen or red face    Pain gets worse    Neck, mouth, tooth, or throat pain gets worse    Fever of 100.4 F (38 C) or higher, or as directed by your healthcare provider  Date Last Reviewed: 10/1/2017    9935-0351 [employer]. [address] [place] [address], [place] [address]. All rights reserved. This information is not intended as a substitute for professional medical care. Always follow your healthcare professional's instructions.

## 2021-06-20 NOTE — PROGRESS NOTES
Chief Complaint   Patient presents with     poss strep     Cough x yesterday losing voice x today, and throat pain x 2 days.     poss BV     Had BV about 2 weeks ago and was Rx with Metronidazole. Thinks it may have came back and would like another Rx for it.          HPI      Patient is here for:    #1. Sore throat - x 2 days, with one day of cough, and hoarseness. No fever, chest pain, shortness of breath.    #2. Vaginal discharge - x a few days, strong smell, white, creamy. No itching. She took one left over Diflucan pill without relief. She was treated for BV (lab confirmed) a few wks ago with resolution of similar symptoms. She is concerned she might have BV again since she did not get better with Diflucan. She would like to be treated with another course of Metronidazole and did not want to do another test.    ROS: Pertinent ROS noted in HPI.     Allergies   Allergen Reactions     Other Environmental Allergy      Adhesive tape     Adhesive Rash     Reglan [Metoclopramide Hcl] Anxiety     Pt dislikes this medication and how it makes her feel. Felt weird, legs were heavy.       Patient Active Problem List   Diagnosis     Allergies     Constipation     Open Wound Of The Lip     Threatened miscarriage in early pregnancy     Pregnant     Left otitis media     Acute colitis     Tobacco use       Family History   Problem Relation Age of Onset     Ovarian cysts Sister        Social History     Social History     Marital status: Single     Spouse name: N/A     Number of children: N/A     Years of education: N/A     Occupational History     Not on file.     Social History Main Topics     Smoking status: Current Every Day Smoker     Packs/day: 0.50     Years: 7.00     Types: Cigarettes     Smokeless tobacco: Current User     Alcohol use No     Drug use: No     Sexual activity: No     Other Topics Concern     Not on file     Social History Narrative           Objective:    Vitals:    09/19/18 1822   BP: 122/70   Pulse: 79    Resp: 16   Temp: 98.6  F (37  C)   SpO2: 97%       Gen:NAD  Throat: oropharynx clear, tonsils normal  Ears: TMs clear, canals normal  Nose: traces of clear rhinorrhea  Neck:NAD  CV: RRR, normal S1S2, no M, R, G  Pulm: CTAB, normal effort  : deferred.    Recent Results (from the past 24 hour(s))   Rapid Strep A Screen-Throat   Result Value Ref Range    Rapid Strep A Antigen No Group A Strep detected, presumptive negative No Group A Strep detected, presumptive negative         Sore throat - likely viral. Supportive cares as directed.   -     Rapid Strep A Screen-Throat  -     Group A Strep, RNA Direct Detection, Throat    Vaginal discharge - presumed BV. Discussed risks vs benefits of empirical therapy, patient verbalized understanding.   -     metroNIDAZOLE (FLAGYL) 500 MG tablet; Take 1 tablet (500 mg total) by mouth 2 (two) times a day for 7 days.

## 2021-06-21 NOTE — PROGRESS NOTES
ASSESSMENT/PLAN:   1. Vaginal candidiasis  fluconazole (DIFLUCAN) 150 MG tablet   2. Vaginal discharge  Wet Prep, Vaginal   3. Fatigue, unspecified type  Thyroid Stimulating Hormone (TSH)    Sedimentation Rate    Vitamin D, Total (25-Hydroxy)    HM1(CBC and Differential)    Lyme Antibody Cascade    HM1 (CBC with Diff)   4. Generalized body aches  Thyroid Stimulating Hormone (TSH)    Sedimentation Rate    Vitamin D, Total (25-Hydroxy)    HM1(CBC and Differential)    Lyme Antibody Cascade    HM1 (CBC with Diff)       1. Vaginal discharge  No symptoms to suggest PID. Patient is not sexually active and declines STI testing today.  Ddx includes BV, yeast infection.  Wet prep showed yeast today.  Will treat for vaginal candidiasis with diflucan.    2. Diffuse body aches and fatigue  Present x 1 week. No localizing symptoms. Afebrile, no symptoms of acute illness.  Will draw CRP, sed rate, CBC, TSH, and refer patient back to primary care for follow up. Patient states she does not have a PCP. I referred her to [facility] to establish care. We helped her to schedule a follow up appointment to establish care.  Plan to follow up on results drawn today when they are available.      Please view below patient instructions for patient education and return precautions as were discussed during visit.  Patient understood and agreed. Patient was stable for discharge.      Patient Instructions:  Patient Instructions   1. Vaginal yeast infection  Take diflucan tab x 1 today.  May repeat dose in 3 days if persistent symptoms.      2. Fatigue and body aches  We are carrying out some blood testing for the fatigue today including thyroid, vitamin D, blood counts, markers for inflammation. These will result tomorrow or the next day. We will notify you of results via [employer].    Please schedule an appointment to establish care with a primary care provider in 1 week to discuss ongoing symptoms.    Be seen sooner if worsening symptoms or new  "symptoms.                    SUBJECTIVE:   Cynthia Miner is a 27 y.o. female with a history of acute colitis, who presents today for evaluation of:  1. Vaginal discharge and odor  X a few days, getting worse.  Discharge is white.  Has had BV a few times in the past and this feels the same.  No vaginal bleeding. LMP middle of October.   She is not sexually active currently; declines STI testing today.  No fever, nausea/vomiting. No dysuria, hematuria.  She does admits to some generalized abdominal pain which goes away on its own. She admits to diarrhea. She states she has a history of \"colitis\" last winter which was diagnosed at the urgency room. She says she was admitted but then left. She has not followed up with her PCP on this.    2. Body aches  She complains of diffuse body aches over the last week. States her arms hurt, her legs hurt, her hands hurt, her feet hurt.  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  No fever. No injuries. No sore throat. Has a mild dry cough.   No joint swelling or redness.  She declines any chance of pregnancy- has her tubes tied.  She has not tried taking anything.      Past Medical History:  Patient Active Problem List   Diagnosis     Allergies     Constipation     Open Wound Of The Lip     Threatened miscarriage in early pregnancy     Pregnant     Left otitis media     Acute colitis     Tobacco use       Surgical History:  Reviewed; Non-contributory    Family History:  Family History   Problem Relation Age of Onset     Ovarian cysts Sister      Reviewed; Non-contributory      Social History:    Social History     Tobacco Use   Smoking Status Current Every Day Smoker     Packs/day: 0.50     Years: 7.00     Pack years: 3.50     Types: Cigarettes   Smokeless Tobacco Current User           Current Medications:  Current Outpatient Medications on File Prior to Visit   Medication Sig Dispense Refill     acetaminophen (TYLENOL) 500 MG tablet Take 1,000 mg by mouth every " 6 (six) hours as needed.       ibuprofen (ADVIL,MOTRIN) 800 MG tablet TK 1 T PO Q 8 H PRF PAIN  2     benzonatate (TESSALON) 200 MG capsule Take 1 capsule (200 mg total) by mouth 3 (three) times a day as needed for cough. 30 capsule 0     fluticasone (FLONASE) 50 mcg/actuation nasal spray 2 sprays into each nostril daily. 16 g 0     No current facility-administered medications on file prior to visit.        Allergies:   Allergies   Allergen Reactions     Other Environmental Allergy      Adhesive tape     Adhesive Rash     Reglan [Metoclopramide Hcl] Anxiety     Pt dislikes this medication and how it makes her feel. Felt weird, legs were heavy.       I personally reviewed patient's past medical, surgical, social, family history and allergies.    ROS:  Review of Systems  An 8point review of systems was conducted and otherwise negative unless noted in HPI.          OBJECTIVE:   /72 (Patient Site: Right Arm, Patient Position: Sitting, Cuff Size: Adult Large)   Pulse 89   Temp 99  F (37.2  C)   Resp 18   Wt 199 lb (90.3 kg)   LMP 10/17/2018 (Approximate)   SpO2 99%   Breastfeeding? No   BMI 34.16 kg/m        General Appearance:  Alert, generally well-appearing female in NAD. Afebrile.    Integument: Warm, dry, no suspicious rashes or lesions.  HEENT: Moist mucus membranes. No posterior pharyngeal erythema. No tonsillar hypertrophy. No exudates.  Neck: no thyromegaly.  Respiratory: No distress.  Lungs clear to ausculation bilaterally. No crackles, wheezes, rhonchi or stridor.  Cardiovascular: Regular rate and rhythm, no murmur, rub or gallop. No obvious chest wall deformities. Peripheral pulses 2+ bilaterally.   GI: Soft, non-distended, normal bowel sounds. Patient reports generalized tenderness to palpation but there is no rigidity or guarding. No masses, organomegaly,.  Neurologic: Alert and orientated appropriately. No focal deficits. Follows commands.   Psych: flat affect.         Radiology:  I  personally ordered and viewed this study. I agree with below radiology findings.    No results found.      Laboratory Studies:  I personally ordered and interpreted these studies.    Results for orders placed or performed in visit on 11/21/18   Wet Prep, Vaginal   Result Value Ref Range    Yeast Result Yeast Seen (!) No yeast seen    Trichomonas No Trichomonas seen No Trichomonas seen    Clue Cells, Wet Prep No Clue cells seen No Clue cells seen

## 2021-06-27 NOTE — PROGRESS NOTES
Progress Notes by Nimisha Covington PA-C at 8/7/2019  4:30 PM     Author: Nimisha Covington PA-C Service: -- Author Type: Physician Assistant    Filed: 8/7/2019  6:51 PM Encounter Date: 8/7/2019 Status: Signed    : Nimisha Covington PA-C (Physician Assistant)         Assessment:       1. Dysuria  Urinalysis-UC if Indicated   2. TMJ (temporomandibular joint syndrome)         Medical Decision Making  Patient presents with blood on the toilet paper when wiping after urination and brief dysuria that started today and has already resolved. UA was negative for hematuria and UTI. Otherwise patient elected to forego pelvic exam given that her symptoms have resolved and she denies vaginal itchiness and discharge. Patient has been taking extremely high dose of ibuprofen which may have thinned the blood. She may then have developed very mild skin irritation of the urethra that then manifested as blood on the toilet paper. Instructed patient to decrease her doses of ibuprofen and to instead alternate with acetaminophen. Also assessed that patient exhibited signs of TMJ syndrome, as her pain worsened after dental work, using her jaw, and with anterior pressure applied to the ear canal.    25 minutes spent with the patient with greater than 50% of face-to-face time spent counseling and/or coordination of care.      Plan:       Reduce amount of ibuprofen to a maximum of 800 mg per dose every 6-8 hours  OTC analgesics discussed.  Cold/warm compresses.  Discussed signs of worsening symptoms and when to follow-up with PCP if no symptom improvement.      Patient Instructions   Patient Education     TMJ Syndrome  The temporomandibular joint (TMJ) is the joint that connects your lower jaw to your head. You can feel it in front of your ears when you open and close your mouth. TMJ disorders involve chronic or recurrent pain in the joint. When treated, symptoms of TMJ disorders usually go away within a few  months.  Causes  There is no widely agreed-on cause of TMJ disorders. They have been linked to injury, arthritis, chronic fatigue syndrome, and fibromyalgia. A definite connection has not been shown, though.  Symptoms    Pain in the face, jaw, or neck    Pain with jaw movement or chewing    Locking or catching sensation of the jaw    Clicking, popping, or grinding sounds with movement of the TMJ    Headache    Ear pain  Home care  Modest, nonsurgical treatments are a good first step toward relieving symptoms. Try the approaches described below.    Rest the jaw by avoiding crunchy or hard-to-chew foods. Dont eat hard or sticky candies. Soft foods and liquids are easier on the jaw.    Protect your jaw while yawning. If you need to yawn, put your fist under your chin to prevent your mouth from opening up too wide.    To help relieve pain, try applying hot or cold packs to the painful area. Try both hot and cold to find out which works best for you. To make a cold pack, put ice cubes in a plastic bag that seals at the top. Wrap the bag in a clean, thin towel or cloth. Never put ice or an ice pack directly on the skin. If you use hot packs (small towels soaked in hot water), be careful not to burn yourself.    You may take acetaminophen or ibuprofen for pain, unless you were given a different pain medicine. (Note: If you have chronic liver or kidney disease or have ever had a stomach ulcer or gastrointestinal bleeding, talk with your healthcare provider before using these medicines. Also talk to your provider if you are taking medicine to prevent blood clots.) Dont give aspirin to a child younger than age 19 unless directed by the chris provider. Taking aspirin can put a child at risk for Reye syndrome. This is a rare but very serious disorder that most often affects the brain and the liver.  Reducing stress  If stress seems to be contributing to your symptoms, try to identify the sources of stress in your life. These  arent always obvious. Common stressors include:    Everyday hassles. These include things such as traffic jams, missed appointments, or car trouble.    Major life changes. These can be good, such as a new baby or job promotion. And they can be bad, such as losing a job or losing a loved one.    Overload. The feeling that you have too many responsibilities and can't take care of everything at once.    Helplessness. Feeling like your problems are more than you can solve.  When possible, do something about your sources of stress. See if you can avoid hassles, limit the amount of change in your life at one time, and take breaks when you feel overloaded.  Unfortunately, many stressful situations cannot be avoided. So learning how to manage stress better is very important. Getting regular exercise, eating nutritious, balanced meals, and getting adequate rest all help to make everyday stress more manageable. Certain techniques are also helpful: relaxation and breathing exercises, visualization, biofeedback, meditation, or simply taking some time out to clear your mind. For more information, talk with your healthcare provider.  Follow-up care  Follow up with your healthcare provider, or as advised. Further testing and additional treatment may be required. If changes to your lifestyle do not improve your symptoms, talk with your healthcare provider about other available therapies. These include bite guards for help with teeth grinding, stress management techniques, and more. If stress is an important factor and does not respond to the above simple measures, talk with your healthcare provider about a referral for stress management.  If X-rays were done, they will be reviewed by a specialist. You will be notified of the results, especially if they affect treatment.  Call 911  Call 911 if any of these occur:    Trouble breathing or swallowing, wheezing    Confusion    Extreme drowsiness or trouble awakening    Fainting or loss  of consciousness    Rapid heart rate  When to seek medical advice  Call your healthcare provider right away if any of these occur:    Swollen or red face    Pain gets worse    Neck, mouth, tooth, or throat pain gets worse    Fever of 100.4 F (38 C) or higher, or as directed by your healthcare provider  Date Last Reviewed: 10/1/2017    5516-0851 [employer]. [address] [place] [address]. All rights reserved. This information is not intended as a substitute for professional medical care. Always follow your healthcare professional's instructions.             Subjective:       Cynthia Miner is a 28 y.o. female here for evaluation of dysuria and jaw pain. Onset of dysuria was today. Patient noted bloody spotting when she wiped after urinating. She denies any pain when wiping. The next time she urinating, she did notice a small amount of burning. After that her symptoms have completely resolved with no more dysuria or blood. Associated symptoms include feeling flush once and lower back pain over the tailbone. Patient denies urinary frequency, hematuria, nausea, vomiting, fevers of 100.4 or higher, vaginal discharge, vaginal itchiness, rashes, and lesions. Last menstrual period was 7/25 and lasted 3-5 days. Patient also notes a history of jaw pain following dental work. She has been taking 2400 mg of ibuprofen several times a day. She continues to have left sided jaw pain. Patient went back to dentist, but they were unable to find a cause. They did obtain x-rays of the teeth which were negative.     The following portions of the patient's history were reviewed and updated as appropriate: allergies, current medications and problem list.    Review of Systems  Pertinent items are noted in HPI.     Allergies  Allergies   Allergen Reactions   ? Other Environmental Allergy      Adhesive tape   ? Adhesive Rash   ? Reglan [Metoclopramide Hcl] Anxiety     Pt dislikes this medication and how it makes  "her feel. Felt weird, legs were heavy.         Objective:       /87 (Patient Site: Right Arm, Patient Position: Sitting, Cuff Size: Adult Regular)   Pulse 69   Ht 5' 4\" (1.626 m)   Wt 198 lb 11.2 oz (90.1 kg)   SpO2 97%   BMI 34.11 kg/m    General appearance: alert, appears stated age, cooperative, no distress and non-toxic  Head: Normocephalic, without obvious abnormality, atraumatic  Ears: left: increased pain with anterior pressure applied to the external canal while opening and closing the jaw  Throat: lips, mucosa, and tongue normal; teeth and gums normal  Neck: no adenopathy and supple, symmetrical, trachea midline  Back: no CVA tenderness, tenderness to palpation of tailbone  Lungs: clear to auscultation bilaterally  Heart: regular rate and rhythm, S1, S2 normal, no murmur, click, rub or gallop  Abdomen: soft, non-tender; bowel sounds normal; no masses,  no organomegaly   Skin: Skin color, texture, turgor normal. No rashes or lesions       Lab Results    Recent Results (from the past 24 hour(s))   Urinalysis-UC if Indicated   Result Value Ref Range    Color, UA Yellow Colorless, Yellow, Straw, Light Yellow    Clarity, UA Clear Clear    Glucose, UA Negative Negative    Bilirubin, UA Negative Negative    Ketones, UA Negative Negative    Specific Gravity, UA 1.020 1.005 - 1.030    Blood, UA Negative Negative    pH, UA 6.0 5.0 - 8.0    Protein, UA Negative Negative mg/dL    Urobilinogen, UA 0.2 E.U./dL 0.2 E.U./dL, 1.0 E.U./dL    Nitrite, UA Negative Negative    Leukocytes, UA Negative Negative     I personally reviewed these results and discussed findings with the patient.             "

## 2021-06-28 NOTE — PROGRESS NOTES
"Progress Notes by Jose Jarvis PA-C at 1/13/2020  3:40 PM     Author: Jose Jarvis PA-C Service: -- Author Type: Physician Assistant    Filed: 1/13/2020  4:39 PM Encounter Date: 1/13/2020 Status: Signed    : Jose Jarvis PA-C (Physician Assistant)       Subjective:      Patient ID: Cynthia Miner is a 29 y.o. female.    Chief Complaint:    HPI  Cynthia Miner is a 29 y.o. female who presents today complaining of one-week worsening history of vaginal discharge that is described as being clear to whitish with a thick discharge.  There is a slight odor as well.  At this time she has no pelvic or abdominal pain flank or back pain to report.  No fever chills or night sweats.  She denies concern for STDs since she is \"not sexually active\".  Has had no constitutional symptoms to include fever chills night sweats fatigue no myalgias arthralgias or skin rash to report.  No external vulvar or vaginal lesions or redness.    She has complained of urinary symptoms to include urinary frequency but no overt gross hematuria, hesitancy urgency or dysuria.      Past Medical History:   Diagnosis Date   ? Abnormal Pap smear of cervix 2012    LEEP procedure   ? Allergies     Created by Conversion    ? ASCUS (atypical squamous cells of undetermined significance) on Pap smear    ? High-risk pregnancy supervision    ? Hypertension    ? Obesity (BMI 30-39.9)    ? Short cervix        Past Surgical History:   Procedure Laterality Date   ? CERVICAL BIOPSY  W/ LOOP ELECTRODE EXCISION     ? FOOT SURGERY Bilateral 2015    Removed bone tissue on lateral side of both feet   ? WISDOM TOOTH EXTRACTION         Family History   Problem Relation Age of Onset   ? Ovarian cysts Sister        Social History     Tobacco Use   ? Smoking status: Current Every Day Smoker     Packs/day: 0.50     Years: 7.00     Pack years: 3.50     Types: Cigarettes   ? Smokeless tobacco: Current User   Substance Use Topics   ? Alcohol use: No   ? Drug use: No "       Review of Systems  As above in HPI, otherwise balance of Review of Systems are negative.    Objective:     /78   Pulse 76   Temp 98.2  F (36.8  C) (Oral)   Resp 14   Wt 171 lb 6.4 oz (77.7 kg)   LMP 12/26/2019 (Exact Date)   SpO2 100%   BMI 29.42 kg/m      Physical Exam  General: Patient is resting comfortably no acute distress is afebrile  HEENT: Head is normocephalic atraumatic   eyes are PERRL EOMI sclera anicteric   TMs are clear bilaterally  Throat is with mild pharyngeal wall erythema and no exudate  No cervical lymphadenopathy present  LUNGS: Clear to auscultation bilaterally  HEART: Regular rate and rhythm  Skin: Without rash non-diaphoretic    Lab:  Recent Results (from the past 24 hour(s))   Wet Prep, Vaginal   Result Value Ref Range    Yeast Result No yeast seen No yeast seen    Trichomonas No Trichomonas seen No Trichomonas seen    Clue Cells, Wet Prep No Clue cells seen No Clue cells seen   Urinalysis-UC if Indicated   Result Value Ref Range    Color, UA Yellow Colorless, Yellow, Straw, Light Yellow    Clarity, UA Clear Clear    Glucose, UA Negative Negative    Bilirubin, UA Negative Negative    Ketones, UA Negative Negative    Specific Gravity, UA >=1.030 1.005 - 1.030    Blood, UA Negative Negative    pH, UA 5.5 5.0 - 8.0    Protein, UA Negative Negative mg/dL    Urobilinogen, UA 1.0 E.U./dL 0.2 E.U./dL, 1.0 E.U./dL    Nitrite, UA Negative Negative    Leukocytes, UA Negative Negative    Bacteria, UA Few (!) None Seen hpf    RBC, UA None Seen None Seen, 0-2 hpf    WBC, UA 0-5 None Seen, 0-5 hpf    Squam Epithel, UA 0-5 None Seen, 0-5 lpf    Mucus, UA Few (!) None Seen lpf       Assessment:     Procedures    The primary encounter diagnosis was Vaginal discharge. Diagnoses of Urinary frequency and Dysuria were also pertinent to this visit.    Plan:     1. Vaginal discharge  Wet Prep, Vaginal   2. Urinary frequency  Urinalysis-UC if Indicated    nitrofurantoin, macrocrystal-monohydrate,  (MACROBID) 100 MG capsule   3. Dysuria  nitrofurantoin, macrocrystal-monohydrate, (MACROBID) 100 MG capsule         Patient Instructions     Increased fluids and rest.  Discussed signs and symptoms of ascending urinary tract infection symptoms to include pyelonephritis. Instructed to turn to clinic if there are increased fever chills night sweats fatigue abdominal pain or flank pain  Antibiotic as written. Risks and benefits of medication discussed.  Indication for return to clinic was gone over.  Patient was instructed to return to clinic if she is not getting good resolution of the symptoms or if any complication or new concerns arise.    As a result of our visit today, here are the action plans for you:    1. Medication(s) to stop: There are no discontinued medications.    2. Medication(s) to start or change:   Medications Ordered   Medications   ? nitrofurantoin, macrocrystal-monohydrate, (MACROBID) 100 MG capsule     Sig: Take 1 capsule (100 mg total) by mouth 2 (two) times a day for 7 days.     Dispense:  14 capsule     Refill:  0       3. Other instructions: Yes      Urinary Tract Infections in Women    Urinary tract infections (UTIs) are most often caused by bacteria (germs). These bacteria enter the urinary tract. The bacteria may come from outside the body. Or they may travel from the skin outside the rectum or vagina into the urethra. Female anatomy makes it easier for bacteria from the bowel to enter a womans urinary tract, which is the most common source of UTI. This means women develop UTIs more often than men. Pain in or around the urinary tract is a common UTI symptom. But the only way to know for sure if you have a UTI for the health care provider to test your urine. The two tests that may be done are the urinalysis and urine culture.  Types of UTIs    Cystitis: A bladder infection (cystitis) is the most common UTI in women. You may have urgent or frequent urination. You may also have pain, burning  when you urinate, and bloody urine.    Urethritis: This is an inflamed urethra, which is the tube that carries urine from the bladder to outside the body. You may have lower stomach or back pain. You may also have urgent or frequent urination.    Pyelonephritis: This is a kidney infection. If not treated, it can be serious and damage your kidneys. In severe cases, you may be hospitalized. You may have a fever and lower back pain.  Medications to treat a UTI  Most UTIs are treated with antibiotics. These kill the bacteria. The length of time you need to take them depends on the type of infection. It may be as short as 3 days. If you have repeated UTIs, a low-dose antibiotic may be needed for several months. Take antibiotics exactly as directed. Dont stop taking them until all of the medication is gone. If you stop taking the antibiotic too soon, the infection may not go away, and you may develop a resistance to the antibiotic. This can make it much harder to treat.  Lifestyle changes to treat and prevent UTIs  The lifestyle changes below will help get rid of your UTI. They may also help prevent future UTIs.    Drink plenty of fluids. This includes water, juice, or other caffeine-free drinks. Fluids help flush bacteria out of your body.    Empty your bladder. Always empty your bladder when you feel the urge to urinate. And always urinate before going to sleep. Urine that stays in your bladder can lead to infection. Try to urinate before and after sex as well.    Practice good personal hygiene. Wipe yourself from front to back after using the toilet. This helps keep bacteria from getting into the urethra.    Use condoms during sex. These help prevent UTIs caused by sexually transmitted bacteria. Also, avoid using spermicides during sex. These can increase the risk of UTIs. Choose other forms of birth control instead. For women who tend to get UTIs after sex, a low-dose of a preventive antibiotic may be used. Be sure to  discuss this option with your health care provider.    Follow up with your health care provider as directed. He or she may test to make sure the infection has cleared. If necessary, additional treatment may be started.  Date Last Reviewed: 9/8/2014 2000-2016 [employer]. [address] [place] [address]. All rights reserved. This information is not intended as a substitute for professional medical care. Always follow your healthcare professional's instructions.

## 2021-06-28 NOTE — PROGRESS NOTES
Progress Notes by Carole Marks CNP at 9/14/2019  3:40 PM     Author: Carole Marks CNP Service: -- Author Type: Nurse Practitioner    Filed: 9/19/2019 11:05 AM Encounter Date: 9/14/2019 Status: Signed    : Carole Marks CNP (Nurse Practitioner)       Chief Complaint   Patient presents with   ? Cyst     on tailbone x 2  days   ? Vaginitis       ASSESSMENT & PLAN:   Diagnoses and all orders for this visit:    Vaginal discharge  -     Wet Prep, Vaginal    Abscess, gluteal cleft  -     cephalexin 500 mg tablet; Take 500 mg by mouth 3 (three) times a day for 10 days.  Dispense: 30 tablet; Refill: 0  -     ibuprofen (ADVIL,MOTRIN) 600 MG tablet; 1 tablet every 6-8 hours as needed for pain.  Take with food.  Dispense: 40 tablet; Refill: 0        MDM:  Possible small/developing pilonidal cyst.  We will try cephalexin, warm packs.  Patient to monitor and return if worsening.      Supportive care discussed.  See discharge instructions below for specific recommendations given.    At the end of the encounter, I discussed results, diagnosis, medications. Discussed red flags for immediate return to clinic/ER, as well as indications for follow up if no improvement. Patient and/or caregiver understood and agreed to plan. Patient was stable for discharge.    SUBJECTIVE    HPI:  HPI  Cynthia Miner presents to the walk-in clinic with vaginal discharge with history of recurrent BV.  Denies chance of STDs or abdominal pain.    Patient also was complaining of pain with sitting and tenderness at the top of the gluteal cleft.  Patient has no history of similar.  Was present for 2 days.    History obtained from the patient.    Past Medical History:   Diagnosis Date   ? Abnormal Pap smear of cervix 2012    LEEP procedure   ? Allergies     Created by Conversion    ? ASCUS (atypical squamous cells of undetermined significance) on Pap smear    ? High-risk pregnancy supervision    ? Hypertension    ? Obesity (BMI 30-39.9)     ? Short cervix        Active Ambulatory (Non-Hospital) Problems    Diagnosis   ? Tobacco use   ? Acute colitis   ? Left otitis media   ? Threatened miscarriage in early pregnancy   ? Allergies   ? Constipation   ? Open Wound Of The Lip       Family History   Problem Relation Age of Onset   ? Ovarian cysts Sister        Social History     Tobacco Use   ? Smoking status: Current Every Day Smoker     Packs/day: 0.50     Years: 7.00     Pack years: 3.50     Types: Cigarettes   ? Smokeless tobacco: Current User   Substance Use Topics   ? Alcohol use: No       Review of Systems   Constitutional: Negative for chills and fever.   Genitourinary: Positive for vaginal discharge. Negative for dysuria, hematuria and vaginal pain.   Skin: Positive for wound (pain in tailbone area ).   All other systems reviewed and are negative.      OBJECTIVE    Vitals:    09/14/19 1609   BP: 137/83   Pulse: 79   Resp: 16   Temp: 98.3  F (36.8  C)   TempSrc: Oral   SpO2: 97%   Weight: 186 lb (84.4 kg)       Physical Exam   Constitutional: She is oriented to person, place, and time. She appears well-developed and well-nourished. No distress.   HENT:   Right Ear: External ear normal.   Left Ear: External ear normal.   Eyes: Conjunctivae are normal. Right eye exhibits no discharge. Left eye exhibits no discharge.   Cardiovascular: Intact distal pulses.   Pulmonary/Chest: Effort normal.   Genitourinary:   Genitourinary Comments: Declined pelvic exam - agreeable to self swab    Musculoskeletal: Normal range of motion.   Neurological: She is alert and oriented to person, place, and time.   Skin: Skin is warm and dry. Capillary refill takes less than 2 seconds.   Pea-sized cyst super portion of gluteal cleft.  No erythema.  Ecchymosis surrounding.     Psychiatric: She has a normal mood and affect. Her behavior is normal. Judgment and thought content normal.       Labs:  Recent Results (from the past 240 hour(s))   Wet Prep, Vaginal   Result Value Ref  Range    Yeast Result No yeast seen No yeast seen    Trichomonas No Trichomonas seen No Trichomonas seen    Clue Cells, Wet Prep No Clue cells seen No Clue cells seen         Radiology:    No results found.    PATIENT INSTRUCTIONS:   Patient Instructions   Take a clean washcloth and soak it in warm water. Apply the warm compress to the cyst at least four times a day for at least 10 minutes.  Alternatively, you can soak in a warm tub.  See handout for instructions on this.      Avoid thong underwear or other things that were likely to irritate this area.    Cephalexin for possible infection in painful area.      Ibuprofen 600 mg 4 times daily.    If you are not feeling much better in around 4 days or cyst is obviously getting larger or pain is intolerable, please follow-up with primary care.    If cephalexin and warm washcloths are working and pain subsides, no need for further follow-up.    Wet prep today did not show BV, yeast or trichomonas.  Please follow-up if a problematic discharge is still present after your next menstrual cycle.    Patient Education     Pilonidal Cyst, Infected (Antibiotic Treatment)  A pilonidal cyst is a swelling that starts under the skin on the sacrum near the tailbone. It may look like a small dimple. It can fill with skin oils, hair, and dead skin cells. It may stay small or grow larger. It may become infected with normal skin bacteria because it often has an opening to the surface.  Causes  The cause of pilonidal cysts has been debated since they were first recognized. A cyst may be present at birth and go unnoticed. Injury, rubbing, or skin irritation may also cause pilonidal cysts. It can also be caused by an ingrown hair. The cause is most likely a combination of these things. Because some injury or irritation can lead to pilonidal cysts, they can be more common in people who sit or drive a lot for work.  Symptoms  A pilonidal cyst may be small and painless. If it becomes inflamed  or infected, you may have these symptoms:    Swelling    Irritation or redness    Pain    Drainage  The cyst can swell and drain on its own. The swelling and drainage can come and go.  Treatment  A limited infection can be treated with antibiotics and home care. You have been given antibiotics to treat your infected pilonidal cyst.  Home care  The following guidelines will help you care for your wound at home:    Sit in a tub filled with about 6 inches of hot water. Keep the water hot for 10 to 15 minutes.    Don't squeeze the pilonidal cyst or stick a needle in it to drain it. This will make the infection worse, or spread it.    Cover the cyst with a pad or something similar to keep it from becoming more irritated, damaged, and painful.  Medicines    Take acetaminophen or ibuprofen for pain, unless you were given a different pain medicine to use. Talk with your doctor before using these medicines if you have chronic liver or kidney disease, or have ever had a stomach ulcer or digestive bleeding. Also talk with your doctor if you are taking blood thinner medicines.    Take the antibiotics that you were prescribed until they are all gone. To make sure the infection is cured, it is important to finish the antibiotics even if the wound looks better.    Use antibiotic cream or ointment if your healthcare provider tells you to.    Preventing future infections  Once this infection has healed, follow these tips to lower the risk for another infection:    Keep the area of the cyst clean by bathing or showering every day.    Don't wear tight-fitting clothing. This will help lessen sweat and irritation of the skin.    You may need surgery to completely remove the cyst if it keeps coming back. The surgery can only be done when the cyst is not infected. Ask your doctor for more information.    Watch for signs of infection listed below so that treatment may be started early.  Follow-up care  Follow up with your healthcare  provider, or as advised. Check your wound every day for the signs listed below.  When to seek medical advice  Call your healthcare provider right away if any of these occur:    Pus coming from the cyst    Increasing local pain, redness, or swelling    Fever of 100.4 F (38.0 C) or higher for more than 2 days, or as directed by your healthcare provider  Date Last Reviewed: 12/1/2016 2000-2017 [employer]. [address] [place] [address], [place] [address]. All rights reserved. This information is not intended as a substitute for professional medical care. Always follow your healthcare professional's instructions.

## 2021-09-17 ENCOUNTER — HOSPITAL ENCOUNTER (OUTPATIENT)
Facility: HOSPITAL | Age: 31
Discharge: HOME | End: 2021-09-17
Attending: SURGERY | Admitting: SURGERY
Payer: COMMERCIAL

## 2021-09-17 ENCOUNTER — ANESTHESIA EVENT (OUTPATIENT)
Dept: SURGERY | Facility: HOSPITAL | Age: 31
End: 2021-09-17
Payer: COMMERCIAL

## 2021-09-17 ENCOUNTER — PREP FOR PROCEDURE (OUTPATIENT)
Dept: SURGERY | Facility: CLINIC | Age: 31
End: 2021-09-17

## 2021-09-17 ENCOUNTER — HOSPITAL ENCOUNTER (EMERGENCY)
Facility: HOSPITAL | Age: 31
Discharge: STILL ADMITTED OR ADMINISTRATIVE | End: 2021-09-17
Attending: EMERGENCY MEDICINE
Payer: COMMERCIAL

## 2021-09-17 ENCOUNTER — HOSPITAL ENCOUNTER (OUTPATIENT)
Facility: HOSPITAL | Age: 31
End: 2021-09-17
Attending: SURGERY | Admitting: SURGERY
Payer: COMMERCIAL

## 2021-09-17 ENCOUNTER — ANESTHESIA (OUTPATIENT)
Dept: SURGERY | Facility: HOSPITAL | Age: 31
End: 2021-09-17
Payer: COMMERCIAL

## 2021-09-17 VITALS
DIASTOLIC BLOOD PRESSURE: 85 MMHG | HEART RATE: 76 BPM | RESPIRATION RATE: 16 BRPM | TEMPERATURE: 98 F | SYSTOLIC BLOOD PRESSURE: 132 MMHG | OXYGEN SATURATION: 100 %

## 2021-09-17 VITALS
DIASTOLIC BLOOD PRESSURE: 83 MMHG | SYSTOLIC BLOOD PRESSURE: 130 MMHG | HEIGHT: 64 IN | RESPIRATION RATE: 16 BRPM | TEMPERATURE: 98.3 F | WEIGHT: 168.2 LBS | BODY MASS INDEX: 28.71 KG/M2 | OXYGEN SATURATION: 100 % | HEART RATE: 65 BPM

## 2021-09-17 DIAGNOSIS — K35.30 ACUTE APPENDICITIS WITH LOCALIZED PERITONITIS, WITHOUT PERFORATION, ABSCESS, OR GANGRENE: Primary | ICD-10-CM

## 2021-09-17 DIAGNOSIS — K35.30 ACUTE APPENDICITIS WITH LOCALIZED PERITONITIS, WITHOUT PERFORATION, ABSCESS, OR GANGRENE: ICD-10-CM

## 2021-09-17 LAB — SARS-COV-2 RNA RESP QL NAA+PROBE: NEGATIVE

## 2021-09-17 PROCEDURE — 99285 EMERGENCY DEPT VISIT HI MDM: CPT | Mod: 25

## 2021-09-17 PROCEDURE — 250N000011 HC RX IP 250 OP 636: Performed by: NURSE ANESTHETIST, CERTIFIED REGISTERED

## 2021-09-17 PROCEDURE — 258N000001 HC RX 258: Performed by: SURGERY

## 2021-09-17 PROCEDURE — 710N000012 HC RECOVERY PHASE 2, PER MINUTE: Performed by: SURGERY

## 2021-09-17 PROCEDURE — 99204 OFFICE O/P NEW MOD 45 MIN: CPT | Mod: 57 | Performed by: SURGERY

## 2021-09-17 PROCEDURE — 360N000076 HC SURGERY LEVEL 3, PER MIN: Performed by: SURGERY

## 2021-09-17 PROCEDURE — 370N000017 HC ANESTHESIA TECHNICAL FEE, PER MIN: Performed by: SURGERY

## 2021-09-17 PROCEDURE — 250N000011 HC RX IP 250 OP 636: Performed by: ANESTHESIOLOGY

## 2021-09-17 PROCEDURE — 88304 TISSUE EXAM BY PATHOLOGIST: CPT | Mod: TC | Performed by: SURGERY

## 2021-09-17 PROCEDURE — 250N000009 HC RX 250: Performed by: NURSE ANESTHETIST, CERTIFIED REGISTERED

## 2021-09-17 PROCEDURE — 87635 SARS-COV-2 COVID-19 AMP PRB: CPT | Performed by: EMERGENCY MEDICINE

## 2021-09-17 PROCEDURE — 258N000003 HC RX IP 258 OP 636: Performed by: ANESTHESIOLOGY

## 2021-09-17 PROCEDURE — 272N000001 HC OR GENERAL SUPPLY STERILE: Performed by: SURGERY

## 2021-09-17 PROCEDURE — 44960 APPENDECTOMY: CPT | Performed by: SURGERY

## 2021-09-17 PROCEDURE — 250N000011 HC RX IP 250 OP 636: Performed by: SURGERY

## 2021-09-17 PROCEDURE — 710N000009 HC RECOVERY PHASE 1, LEVEL 1, PER MIN: Performed by: SURGERY

## 2021-09-17 PROCEDURE — 250N000025 HC SEVOFLURANE, PER MIN: Performed by: SURGERY

## 2021-09-17 PROCEDURE — 250N000009 HC RX 250: Performed by: SURGERY

## 2021-09-17 PROCEDURE — 999N000141 HC STATISTIC PRE-PROCEDURE NURSING ASSESSMENT: Performed by: SURGERY

## 2021-09-17 RX ORDER — LIDOCAINE 40 MG/G
CREAM TOPICAL
Status: DISCONTINUED | OUTPATIENT
Start: 2021-09-17 | End: 2021-09-17 | Stop reason: HOSPADM

## 2021-09-17 RX ORDER — PROPOFOL 10 MG/ML
INJECTION, EMULSION INTRAVENOUS PRN
Status: DISCONTINUED | OUTPATIENT
Start: 2021-09-17 | End: 2021-09-17

## 2021-09-17 RX ORDER — DEXAMETHASONE SODIUM PHOSPHATE 4 MG/ML
4 INJECTION, SOLUTION INTRA-ARTICULAR; INTRALESIONAL; INTRAMUSCULAR; INTRAVENOUS; SOFT TISSUE ONCE
Status: DISCONTINUED | OUTPATIENT
Start: 2021-09-17 | End: 2021-09-17 | Stop reason: HOSPADM

## 2021-09-17 RX ORDER — FENTANYL CITRATE 50 UG/ML
50 INJECTION, SOLUTION INTRAMUSCULAR; INTRAVENOUS EVERY 5 MIN PRN
Status: DISCONTINUED | OUTPATIENT
Start: 2021-09-17 | End: 2021-09-17 | Stop reason: HOSPADM

## 2021-09-17 RX ORDER — SODIUM CHLORIDE, SODIUM LACTATE, POTASSIUM CHLORIDE, CALCIUM CHLORIDE 600; 310; 30; 20 MG/100ML; MG/100ML; MG/100ML; MG/100ML
INJECTION, SOLUTION INTRAVENOUS CONTINUOUS
Status: DISCONTINUED | OUTPATIENT
Start: 2021-09-17 | End: 2021-09-17 | Stop reason: HOSPADM

## 2021-09-17 RX ORDER — SODIUM CHLORIDE 9 MG/ML
INJECTION, SOLUTION INTRAVENOUS ONCE
Status: DISCONTINUED | OUTPATIENT
Start: 2021-09-17 | End: 2021-09-17 | Stop reason: HOSPADM

## 2021-09-17 RX ORDER — MEPERIDINE HYDROCHLORIDE 25 MG/ML
12.5 INJECTION INTRAMUSCULAR; INTRAVENOUS; SUBCUTANEOUS
Status: COMPLETED | OUTPATIENT
Start: 2021-09-17 | End: 2021-09-17

## 2021-09-17 RX ORDER — SODIUM CHLORIDE, SODIUM LACTATE, POTASSIUM CHLORIDE, AND CALCIUM CHLORIDE .6; .31; .03; .02 G/100ML; G/100ML; G/100ML; G/100ML
IRRIGANT IRRIGATION PRN
Status: DISCONTINUED | OUTPATIENT
Start: 2021-09-17 | End: 2021-09-17 | Stop reason: HOSPADM

## 2021-09-17 RX ORDER — FENTANYL CITRATE 50 UG/ML
INJECTION, SOLUTION INTRAMUSCULAR; INTRAVENOUS PRN
Status: DISCONTINUED | OUTPATIENT
Start: 2021-09-17 | End: 2021-09-17

## 2021-09-17 RX ORDER — PIPERACILLIN SODIUM, TAZOBACTAM SODIUM 3; .375 G/15ML; G/15ML
3.38 INJECTION, POWDER, LYOPHILIZED, FOR SOLUTION INTRAVENOUS ONCE
Status: DISCONTINUED | OUTPATIENT
Start: 2021-09-17 | End: 2021-09-17 | Stop reason: HOSPADM

## 2021-09-17 RX ORDER — KETOROLAC TROMETHAMINE 15 MG/ML
15 INJECTION, SOLUTION INTRAMUSCULAR; INTRAVENOUS EVERY 6 HOURS PRN
Status: DISCONTINUED | OUTPATIENT
Start: 2021-09-17 | End: 2021-09-17 | Stop reason: HOSPADM

## 2021-09-17 RX ORDER — ONDANSETRON 2 MG/ML
INJECTION INTRAMUSCULAR; INTRAVENOUS PRN
Status: DISCONTINUED | OUTPATIENT
Start: 2021-09-17 | End: 2021-09-17

## 2021-09-17 RX ORDER — OXYCODONE HYDROCHLORIDE 5 MG/1
5 TABLET ORAL EVERY 4 HOURS PRN
Status: DISCONTINUED | OUTPATIENT
Start: 2021-09-17 | End: 2021-09-17 | Stop reason: HOSPADM

## 2021-09-17 RX ORDER — ONDANSETRON 4 MG/1
4 TABLET, ORALLY DISINTEGRATING ORAL EVERY 30 MIN PRN
Status: DISCONTINUED | OUTPATIENT
Start: 2021-09-17 | End: 2021-09-17 | Stop reason: HOSPADM

## 2021-09-17 RX ORDER — SODIUM CHLORIDE, SODIUM LACTATE, POTASSIUM CHLORIDE, CALCIUM CHLORIDE 600; 310; 30; 20 MG/100ML; MG/100ML; MG/100ML; MG/100ML
INJECTION, SOLUTION INTRAVENOUS CONTINUOUS
Status: CANCELLED | OUTPATIENT
Start: 2021-09-17

## 2021-09-17 RX ORDER — CEFAZOLIN SODIUM 2 G/100ML
2 INJECTION, SOLUTION INTRAVENOUS
Status: COMPLETED | OUTPATIENT
Start: 2021-09-17 | End: 2021-09-17

## 2021-09-17 RX ORDER — ALBUTEROL SULFATE 0.83 MG/ML
2.5 SOLUTION RESPIRATORY (INHALATION) EVERY 4 HOURS PRN
Status: DISCONTINUED | OUTPATIENT
Start: 2021-09-17 | End: 2021-09-17 | Stop reason: HOSPADM

## 2021-09-17 RX ORDER — HYDROMORPHONE HCL IN WATER/PF 6 MG/30 ML
0.4 PATIENT CONTROLLED ANALGESIA SYRINGE INTRAVENOUS EVERY 5 MIN PRN
Status: DISCONTINUED | OUTPATIENT
Start: 2021-09-17 | End: 2021-09-17 | Stop reason: HOSPADM

## 2021-09-17 RX ORDER — KETAMINE HYDROCHLORIDE 50 MG/ML
INJECTION, SOLUTION INTRAMUSCULAR; INTRAVENOUS PRN
Status: DISCONTINUED | OUTPATIENT
Start: 2021-09-17 | End: 2021-09-17

## 2021-09-17 RX ORDER — LIDOCAINE HYDROCHLORIDE AND EPINEPHRINE 10; 10 MG/ML; UG/ML
INJECTION, SOLUTION INFILTRATION; PERINEURAL PRN
Status: DISCONTINUED | OUTPATIENT
Start: 2021-09-17 | End: 2021-09-17 | Stop reason: HOSPADM

## 2021-09-17 RX ORDER — LIDOCAINE HYDROCHLORIDE 20 MG/ML
INJECTION, SOLUTION INFILTRATION; PERINEURAL PRN
Status: DISCONTINUED | OUTPATIENT
Start: 2021-09-17 | End: 2021-09-17

## 2021-09-17 RX ORDER — DEXAMETHASONE SODIUM PHOSPHATE 10 MG/ML
INJECTION, SOLUTION INTRAMUSCULAR; INTRAVENOUS PRN
Status: DISCONTINUED | OUTPATIENT
Start: 2021-09-17 | End: 2021-09-17

## 2021-09-17 RX ORDER — HALOPERIDOL 5 MG/ML
1 INJECTION INTRAMUSCULAR
Status: DISCONTINUED | OUTPATIENT
Start: 2021-09-17 | End: 2021-09-17 | Stop reason: HOSPADM

## 2021-09-17 RX ORDER — FENTANYL CITRATE 50 UG/ML
50 INJECTION, SOLUTION INTRAMUSCULAR; INTRAVENOUS
Status: DISCONTINUED | OUTPATIENT
Start: 2021-09-17 | End: 2021-09-17 | Stop reason: HOSPADM

## 2021-09-17 RX ORDER — TRAMADOL HYDROCHLORIDE 50 MG/1
50 TABLET ORAL EVERY 6 HOURS PRN
Qty: 10 TABLET | Refills: 0 | Status: SHIPPED | OUTPATIENT
Start: 2021-09-17 | End: 2021-09-20

## 2021-09-17 RX ORDER — ONDANSETRON 2 MG/ML
4 INJECTION INTRAMUSCULAR; INTRAVENOUS EVERY 30 MIN PRN
Status: DISCONTINUED | OUTPATIENT
Start: 2021-09-17 | End: 2021-09-17 | Stop reason: HOSPADM

## 2021-09-17 RX ORDER — LIDOCAINE 40 MG/G
CREAM TOPICAL
Status: CANCELLED | OUTPATIENT
Start: 2021-09-17

## 2021-09-17 RX ADMIN — ONDANSETRON 4 MG: 2 INJECTION INTRAMUSCULAR; INTRAVENOUS at 15:04

## 2021-09-17 RX ADMIN — MIDAZOLAM HYDROCHLORIDE 2 MG: 1 INJECTION, SOLUTION INTRAMUSCULAR; INTRAVENOUS at 14:22

## 2021-09-17 RX ADMIN — KETOROLAC TROMETHAMINE 15 MG: 15 INJECTION, SOLUTION INTRAMUSCULAR; INTRAVENOUS at 15:40

## 2021-09-17 RX ADMIN — FENTANYL CITRATE 50 MCG: 50 INJECTION, SOLUTION INTRAMUSCULAR; INTRAVENOUS at 14:40

## 2021-09-17 RX ADMIN — MEPERIDINE HYDROCHLORIDE 12.5 MG: 25 INJECTION INTRAMUSCULAR; INTRAVENOUS; SUBCUTANEOUS at 15:55

## 2021-09-17 RX ADMIN — SODIUM CHLORIDE, POTASSIUM CHLORIDE, SODIUM LACTATE AND CALCIUM CHLORIDE: 600; 310; 30; 20 INJECTION, SOLUTION INTRAVENOUS at 14:14

## 2021-09-17 RX ADMIN — HYDROMORPHONE HYDROCHLORIDE 1 MG: 1 INJECTION, SOLUTION INTRAMUSCULAR; INTRAVENOUS; SUBCUTANEOUS at 14:52

## 2021-09-17 RX ADMIN — CEFAZOLIN SODIUM 2 G: 2 INJECTION, SOLUTION INTRAVENOUS at 14:39

## 2021-09-17 RX ADMIN — FENTANYL CITRATE 50 MCG: 50 INJECTION, SOLUTION INTRAMUSCULAR; INTRAVENOUS at 14:30

## 2021-09-17 RX ADMIN — ROCURONIUM BROMIDE 50 MG: 50 INJECTION, SOLUTION INTRAVENOUS at 14:30

## 2021-09-17 RX ADMIN — SUGAMMADEX 200 MG: 100 INJECTION, SOLUTION INTRAVENOUS at 15:10

## 2021-09-17 RX ADMIN — METRONIDAZOLE 500 MG: 500 INJECTION, SOLUTION INTRAVENOUS at 14:42

## 2021-09-17 RX ADMIN — PROPOFOL 110 MG: 10 INJECTION, EMULSION INTRAVENOUS at 14:30

## 2021-09-17 RX ADMIN — DEXAMETHASONE SODIUM PHOSPHATE 10 MG: 10 INJECTION, SOLUTION INTRAMUSCULAR; INTRAVENOUS at 14:30

## 2021-09-17 RX ADMIN — KETAMINE HYDROCHLORIDE 50 MG: 50 INJECTION, SOLUTION INTRAMUSCULAR; INTRAVENOUS at 14:30

## 2021-09-17 RX ADMIN — MEPERIDINE HYDROCHLORIDE 12.5 MG: 25 INJECTION INTRAMUSCULAR; INTRAVENOUS; SUBCUTANEOUS at 15:43

## 2021-09-17 RX ADMIN — LIDOCAINE HYDROCHLORIDE 60 MG: 20 INJECTION, SOLUTION INFILTRATION; PERINEURAL at 14:30

## 2021-09-17 ASSESSMENT — MIFFLIN-ST. JEOR
SCORE: 1453.44
SCORE: 1467.95

## 2021-09-17 ASSESSMENT — LIFESTYLE VARIABLES: TOBACCO_USE: 1

## 2021-09-17 NOTE — OP NOTE
Name:  Cynthia Miner  PCP:  No primary care provider on file.  Procedure Date:  9/17/2021      LAPAROSCOPIC APPENDECTOMY      Pre-Procedure Diagnosis:  Appendicitis     Post-Procedure Diagnosis:    Acute uncomplicated appendicitis    Anesthesia Type:    General    Estimated Blood Loss:   5 cc    Specimens:    Appendix    Complications:    None apparent    Indication for procedure:  This is a 30-year-old female who presented to urgent care with a complaint of abdominal pain.  It started yesterday and it worsened throughout the evening and into today.  It started periumbilically and localized to the right lower quadrant.  CT demonstrated findings consistent with acute appendicitis.  She was sent to [facility] for definitive management.    Operative Report:    After informed consent was obtained, and the risks and benefits of the procedure were discussed, the patient was brought to the operating room and placed in the supine position.  General anesthesia with endotracheal intubation was performed by the department of anesthesia.  The patient was then prepped and draped in the standard sterile fashion.  A time out was performed and perioperative antibiotics were administered.  0.5% Marcaine with epinephrine was injected at the skin just superior and lateral to the umbilicus.  This was then sharply incised. The abdomen was then entered using a 5 mm optical trocar.  Pneumoperitoneum was achieved, which the patient tolerated well. On initial survey of the abdominal cavity no underlying injury was noted.  The patient was then placed in the Trendelenburg position with the right side up.  2 additional trocars were then placed in the suprapubic region and left lower quadrant after injection of local anesthetic.  The abdomen was further explored.  The appendix was found in the right lower quadrant and appeared dilated and inflamed without evidence of gangrene or perforation. The appendix was dissected free from the  surrounding tissues.  The mesoappendix and appendiceal artery were divided with electrocautery.  This helped to achieve hemostasis.  This left the appendix free at the base attached to the cecum.  A 45 mm blue load endoscopic stapler was inserted.  The appendix was divided at its base against the cecum.  The appendix was then placed into an Endo Catch bag and removed.  The abdomen was surveyed for hemostasis and aspirated clear of all fluid.  The fascia of the left lower quadrant incision site was closed with 0 Vicryl.   Insufflation was released and the remaining ports were removed.  The skin was closed with interrupted 4-0 Vicryl, followed by Benzoin, steri-strips and a sterile bandage.    Disposition:  The patient tolerated the procedure well.  She will spend some time in the recovery room and be discharged home.    Cathryn Cervantes DO  General Surgeon  [facility]  Surgery [address]  [place] [address]  Office: [phone]  Employed by - [facility]

## 2021-09-17 NOTE — ANESTHESIA PREPROCEDURE EVALUATION
Anesthesia Pre-Procedure Evaluation    Patient: Cynthia Miner   MRN: [id number] : 1990        Preoperative Diagnosis: Appendicitis [K37]   Procedure : Procedure(s):  APPENDECTOMY, LAPAROSCOPIC     Past Medical History:   Diagnosis Date     Abnormal Pap smear of cervix     LEEP procedure     ASCUS (atypical squamous cells of undetermined significance) on Pap smear      High-risk pregnancy supervision      Hypertension      Obesity (BMI 30-39.9)      Other allergy, other than to medicinal agents     Created by Conversion      Short cervix       Past Surgical History:   Procedure Laterality Date     BIOPSY CERVICAL, LOCAL EXCISION, SINGLE/MULTIPLE       FOOT SURGERY Bilateral     Removed bone tissue on lateral side of both feet     TUBAL LIGATION       WISDOM TOOTH EXTRACTION        Allergies   Allergen Reactions     Other Environmental Allergy Unknown     Adhesive tape     Adhesive [Mecrylate] Rash     Reglan [Metoclopramide] Anxiety     Pt dislikes this medication and how it makes her feel. Felt weird, legs were heavy.      Social History     Tobacco Use     Smoking status: Current Every Day Smoker     Packs/day: 1.00     Years: 7.00     Pack years: 7.00     Types: Cigarettes     Smokeless tobacco: Never Used   Substance Use Topics     Alcohol use: No      Wt Readings from Last 1 Encounters:   21 76.3 kg (168 lb 3.2 oz)        Anesthesia Evaluation            ROS/MED HX  ENT/Pulmonary:     (+) tobacco use,     Neurologic:       Cardiovascular:     (+) hypertension-----    METS/Exercise Tolerance:     Hematologic:       Musculoskeletal:       GI/Hepatic:       Renal/Genitourinary:       Endo:       Psychiatric/Substance Use:       Infectious Disease:       Malignancy:       Other:            Physical Exam    Airway        Mallampati: II    Neck ROM: full     Respiratory Devices and Support         Dental  no notable dental history         Cardiovascular   cardiovascular exam normal           Pulmonary   pulmonary exam normal                OUTSIDE LABS:  CBC:   Lab Results   Component Value Date    WBC 8.1 11/21/2018    HGB 14.7 11/21/2018    HCT 42.6 11/21/2018     11/21/2018     BMP: No results found for: NA, POTASSIUM, CHLORIDE, CO2, BUN, CR, GLC  COAGS: No results found for: PTT, INR, FIBR  POC:   Lab Results   Component Value Date    HCG Negative 04/04/2018     HEPATIC: No results found for: ALBUMIN, PROTTOTAL, ALT, AST, GGT, ALKPHOS, BILITOTAL, BILIDIRECT, DANDRE  OTHER:   Lab Results   Component Value Date    TSH 2.09 11/21/2018    SED 8 11/21/2018       Anesthesia Plan    ASA Status:  2      Anesthesia Type: General.     - Airway: ETT   Induction: RSI.           Consents    Anesthesia Plan(s) and associated risks, benefits, and realistic alternatives discussed. Questions answered and patient/representative(s) expressed understanding.     - Discussed with:  Patient         Postoperative Care            Comments:                Duc Patton MD

## 2021-09-17 NOTE — ANESTHESIA CARE TRANSFER NOTE
Patient: Cynthia Miner    Procedure(s):  APPENDECTOMY, LAPAROSCOPIC    Diagnosis: Appendicitis [K37]  Diagnosis Additional Information: No value filed.    Anesthesia Type:   General     Note:    Oropharynx: oropharynx clear of all foreign objects  Level of Consciousness: awake  Oxygen Supplementation: face mask  Level of Supplemental Oxygen (L/min / FiO2): 8  Independent Airway: airway patency satisfactory and stable  Dentition: dentition unchanged  Vital Signs Stable: post-procedure vital signs reviewed and stable  Report to RN Given: handoff report given  Patient transferred to: PACU    Handoff Report: Identifed the Patient, Identified the Reponsible Provider, Reviewed the pertinent medical history, Discussed the surgical course, Reviewed Intra-OP anesthesia mangement and issues during anesthesia, Set expectations for post-procedure period and Allowed opportunity for questions and acknowledgement of understanding      Vitals:  Vitals Value Taken Time   /87 09/17/21 1522   Temp 36.9  C (98.4  F) 09/17/21 1522   Pulse 99 09/17/21 1523   Resp 20 09/17/21 1523   SpO2 100 % 09/17/21 1523   Vitals shown include unvalidated device data.    Electronically Signed By: KIMBERLY Chicas CRNA  September 17, 2021  3:24 PM

## 2021-09-17 NOTE — ANESTHESIA POSTPROCEDURE EVALUATION
Patient: Cynthia Miner    Procedure(s):  APPENDECTOMY, LAPAROSCOPIC    Diagnosis:Appendicitis [K37]  Diagnosis Additional Information: No value filed.    Anesthesia Type:  General    Note:  Disposition: Outpatient   Postop Pain Control: Uneventful            Sign Out: Well controlled pain   PONV: No   Neuro/Psych: Uneventful            Sign Out: Acceptable/Baseline neuro status   Airway/Respiratory: Uneventful            Sign Out: Acceptable/Baseline resp. status   CV/Hemodynamics: Uneventful            Sign Out: Acceptable CV status; No obvious hypovolemia; No obvious fluid overload   Other NRE: NONE   DID A NON-ROUTINE EVENT OCCUR? No           Last vitals:  Vitals Value Taken Time   /77 09/17/21 1545   Temp 36.7  C (98  F) 09/17/21 1545   Pulse 68 09/17/21 1555   Resp 19 09/17/21 1555   SpO2 97 % 09/17/21 1555   Vitals shown include unvalidated device data.    Electronically Signed By: Angelika Reeder MD  September 17, 2021  5:51 PM

## 2021-09-17 NOTE — ANESTHESIA PROCEDURE NOTES
Airway       Patient location during procedure: OR       Procedure Start/Stop Times: 9/17/2021 2:32 PM  Staff -        CRNA: Paramjit Ellis APRN CRNA       Performed By: CRNAIndications and Patient Condition       Indications for airway management: cris-procedural       Induction type:intravenous       Mask difficulty assessment: 1 - vent by mask    Final Airway Details       Final airway type: endotracheal airway       Successful airway: ETT - single  Endotracheal Airway Details        ETT size (mm): 7.0       Successful intubation technique: direct laryngoscopy       DL Blade Type: Siddiqi 2       Grade View of Cords: 1       Adjucts: stylet       Position: Right       Measured from: gums/teeth       Secured at (cm): 21       Bite block used: None    Post intubation assessment        Placement verified by: capnometry and equal breath sounds        Number of attempts at approach: 1       Number of other approaches attempted: 0       Secured with: silk tape       Ease of procedure: easy       Dentition: Intact and Unchanged

## 2021-09-17 NOTE — ED TRIAGE NOTES
Patient was sent in from urgency room, was seen this morning at the  for evaluation of abdominal pain that started last night, states that she was diagnosed with appendicitis after having a CT and told to go to ER for surgery.

## 2021-09-17 NOTE — H&P
History:  30 year old year old female who I have been consulted by Dr. Stone for evaluation of abdominal pain.  She first developed abdominal pain yesterday morning.  It was located around her umbilicus.  She describes it as crampy.  It worsened throughout the day and night and she was unable to sleep last evening.  This morning it progressively worsened and localized to the right lower quadrant.  She denies any other associated symptoms.  She has not had any nausea, vomiting, fever, chills, or decreased appetite.  She has never had symptoms like this before.  It is worsened with all types of movement.  She presented to urgent care where ultimately a CT of the abdomen and pelvis was obtained.  It demonstrated findings consistent with acute appendicitis.  She was then sent to [facility] for definitive management.    Allergies:  Other environmental allergy, Adhesive [mecrylate], and Reglan [metoclopramide]    Past medical history:  Obesity    Past surgical history:  [place] tooth extraction  Tubal ligation  Bilateral foot surgery    Current medications:  Takes Dulcolax as needed    Family history:  No family history of anesthesia problems 1 went away    Social history:  Reports that she has been smoking cigarettes. She has a 15.00 pack-year smoking history. She has never used smokeless tobacco. She reports that she does not drink alcohol and does not use drugs.    Review of Systems:  General: No complaints or constitutional symptoms  Skin: No complaints or symptoms   Hematologic/Lymphatic: No symptoms or complaints  Psychiatric: No symptoms or complaints  Endocrine: No excessive fatigue, no hypermetabolic symptoms reported  Respiratory: No cough, shortness of breath, or wheezing  Cardiovascular: No chest pain or dyspnea on exertion  Gastrointestinal: As per HPI  Musculoskeletal: No recent injuries reported  Neurological: No focal neurologic defects reported.      Exam:  [facility] 08/20/2021   General: Alert, cooperative,  appears stated age   Skin: Skin color, texture, turgor normal, no rashes or lesions   Lymphatic: No obvious adenopathy, no swelling   Eyes: No scleral icterus, pupils equal  HENT: No traumatic injury to the head or face, no gross abnormalities  Lungs: Normal respiratory effort, breath sounds equal bilaterally  Heart: Regular rate and rhythm  Abdomen: Soft, nondistended, tender to palpation in right lower quadrant without guarding or rebound tenderness  Musculoskeletal: No obvious swelling  Neurologic: Grossly intact      Imaging:   Pertinent images personally reviewed by myself and discussed with the patient.  Radiology reports:  EXAM: CT ABDOMEN PELVIS WO   LOCATION: [facility] [address]   DATE/TIME: 9/17/2021 9:48 AM     INDICATION: Right flank pain right upper quadrant abdominal pain.   COMPARISON: 1/15/2020 ultrasound. CT from 9/29/2018.   TECHNIQUE: CT scan of the abdomen and pelvis was performed without IV contrast. Multiplanar reformats were obtained. Dose reduction techniques were used.   CONTRAST: None.     FINDINGS:   LOWER CHEST: Normal.     HEPATOBILIARY: No calcified gallstones or biliary dilatation. No liver lesion is seen without the benefit of IV contrast.     PANCREAS: Normal.     SPLEEN: Normal.     ADRENAL GLANDS: Normal.     KIDNEYS/BLADDER: Normal.     BOWEL: No free air. The stomach is distended with fluid. Normal caliber small bowel. Moderate amount of stool in the colon. There is an appendicolith. The tip of the appendix is dilated measuring up to 12 mm and there is periappendiceal inflammation. No free air, or abscess. Trace pelvic free fluid. There is diverticulosis of the descending and sigmoid colon. No signs of diverticulitis.     LYMPH NODES: No lymphadenopathy.     VASCULATURE: Unremarkable.     PELVIC ORGANS: Uterus is present. Small amount of pelvic free fluid. No adnexal mass.     MUSCULOSKELETAL: Small fat-containing umbilical hernia.     IMPRESSION:   1.  Findings  suspicious for tip appendicitis. No perforation or abscess formation. Trace pelvic free fluid.   2.  Small fat-containing umbilical hernia.       Assessment:   Cynthia Miner is a 30 year old female with acute abdominal pain secondary to acute appendicitis    Plan:  The pathophysiology of appendicitis was discussed with the patient along with management options which include conservative with antibiotics and surgical with appendectomy.  She is interested in pursuing surgical management.  The risks, benefits, postoperative recovery and restrictions were reviewed.  All questions answered.  Consent obtained.  To the OR for laparoscopic appendectomy with anticipated discharge home from the recovery room.    Cathryn Cervantes DO  General Surgeon  [facility]  Surgery Clinic - [address]  [place] [address]  Office: [phone]  Employed by - [facility]

## 2021-09-17 NOTE — ED PROVIDER NOTES
Emergency Department Encounter     Evaluation Date & Time:   No admission date for patient encounter.    CHIEF COMPLAINT:  appendicitis      Triage Note:No notes on file      Impression and Plan       FINAL IMPRESSION:    ICD-10-CM    1. Acute appendicitis with localized peritonitis, without perforation, abscess, or gangrene  K35.30 CANCELED: Laparoscopic appendectomy     CANCELED: Laparoscopic appendectomy         ED COURSE & MEDICAL DECISION MAKIN:49 AM I met with patient for initial interview and exam. PPE used includes cap, N95, surgical mask, protective glasses, gloves.  12:00 PM I spoke to General Surgeon Dr. Cervantes about the patient. They have the patient on the schedule for this afternoon.    30 year old female, history of asthma and s/p tubal ligation, who [facility] for evaluation of appendicitis. Patient had onset of lower abdominal pain last night, which has become more generalized since onset.  No N/V. Notes diarrhea due to dulcolax. No urinary symptoms or fevers.     On exam, abdomen is soft with moderate tenderness to palpation RUQ and mild tenderness to palpation diffusely otherwise; no peritoneal signs.    CT imaging and labs reviewed from [facility].    General Surgery consulted and Dr. Cervantes has patient on OR schedule for this afternoon.    COVID test ordered. Patient given IV Zosyn and IVF initiated.  NPO status ordered.    Patient sent to pre-op from ED.  Patient stable throughout ED course.      At the conclusion of the encounter I discussed the results of all the tests and the disposition. The questions were answered. The patient acknowledged understanding and was agreeable with the care plan.      MEDICATIONS GIVEN IN THE EMERGENCY DEPARTMENT:  Medications - No data to display    NEW PRESCRIPTIONS STARTED AT TODAY'S ED VISIT:  Discharge Medication List as of 2021  1:07 PM          HPI     HPI     Cynthiaher JOSE R Miner is a 30 year old female with a pertinent  history of asthma and s/p tubal ligation who presents to this ED by walk-in on referral from the [facility] for evaluation of appendicitis.    Per chart review, patient was seen at [facility] today for evaluation of abdominal pain. Patient reported generalized abdominal pain since last night. No nausea or vomiting. Did note diarrhea but attributes that to using dulcolax daily. Pain worse with movement. Tylenol and Advil did not help. CT showed suspicion for tip appendicitis. No perforation or abscess formation. Trace pelvic free fluid. Small fat-containing umbilical hernia. Patient transferred to [facility] for appendectomy with Dr. Cervatnes.    Patient had onset of abdominal pain last night. The pain is located across her lower abdomen and has since become more generalized in location. She is unable to describe the pain, but it worsens with walking. Notes diarrhea due to dulcolax, but denies any nausea or vomiting. No urinary symptoms or fevers.     Last ate something last night and had some sips of pop this morning. LMP was 8/20/21.    She has otherwise been in her usual state of health and denies chest pain, shortness of breath, cough or other concerns.      REVIEW OF SYSTEMS:  All other systems reviewed and are negative.      Medical History     Past Medical History:   Diagnosis Date     Abnormal Pap smear of cervix 2012     ASCUS (atypical squamous cells of undetermined significance) on Pap smear      High-risk pregnancy supervision      Hypertension      Obesity (BMI 30-39.9)      Other allergy, other than to medicinal agents      Short cervix        Past Surgical History:   Procedure Laterality Date     BIOPSY CERVICAL, LOCAL EXCISION, SINGLE/MULTIPLE       FOOT SURGERY Bilateral 2015    Removed bone tissue on lateral side of both feet     TUBAL LIGATION       WISDOM TOOTH EXTRACTION         Family History   Problem Relation Age of Onset     Ovarian cysts Sister        Social History  "    Tobacco Use     Smoking status: Current Every Day Smoker     Packs/day: 1.00     Years: 7.00     Pack years: 7.00     Types: Cigarettes     Smokeless tobacco: Never Used   Substance Use Topics     Alcohol use: No     Drug use: No       ibuprofen (ADVIL,MOTRIN) 400 MG tablet  NO ACTIVE MEDICATIONS  traMADol (ULTRAM) 50 MG tablet        Physical Exam     First Vitals:  Patient Vitals for the past 24 hrs:   BP Temp Temp src Pulse Resp SpO2 Height Weight   09/17/21 1311 130/83 98.3  F (36.8  C) Oral 65 16 100 % -- 76.3 kg (168 lb 3.2 oz)   09/17/21 1209 130/83 98  F (36.7  C) Oral 75 16 99 % 1.626 m (5' 4\") 74.8 kg (165 lb)       PHYSICAL EXAM:   Physical Exam    GENERAL: Awake, alert.  In mild acute distress.   HEENT: Normocephalic, atraumatic. Pupils equal, round and reactive. Conjunctiva normal.   NECK: No stridor.  PULMONARY: Symmetrical breath sounds without distress.  Lungs clear to auscultation bilaterally without wheezes, rhonchi or rales.  CARDIO: Regular rate and rhythm.  No significant murmur, rub or gallop.  Radial pulses strong and symmetrical.  ABDOMINAL: Abdomen soft, non-distended with moderate tenderness to palpation RUQ and mild tenderness to palpation diffusely otherwise; no rebound tenderness or guarding.   EXTREMITIES: No lower extremity swelling or edema.      NEURO: Alert and oriented to person, place and time.  Cranial nerves grossly intact.  No focal motor deficit.  PSYCH: Normal mood and affect.  SKIN: No rashes.     Results     I, Majo Barrow, am serving as a scribe to document services personally performed by Karissa Stone MD based on my observation and the provider's statements to me. IKarissa MD attest that Majo Barrow is acting in a scribe capacity, has observed my performance of the services and has documented them in accordance with my direction.    Karissa Stone MD  Emergency Medicine  [facility] EMERGENCY DEPARTMENT           Karissa Stone " MD Trista  09/18/21 0958

## 2021-09-17 NOTE — ED PROVIDER NOTES
Expected Patient Referral to ED  10:43 AM    Referring Clinic/Provider:  [facility]    Reason for referral/Clinical facts:  Acute Dr. Billy blank of surgery aware.    Recommendations provided:  Will see in the ER.    Caller was informed that this institution does  possess the capabilities and/or resources to provide for patient and should be transferred to our institution.    Based on the information provided, discussed that this patient likely is nota good candidate for direct admission to this institution and that provider could proceed as such.  If however direct admit is sought and any hurdles encountered, this ED would be happy to see the patient and evaluate.    Informed caller that recommendations provided are recommendations based only on the facts provided and that they responsible to accept or reject the advice, or to seek a formal in person consultation as needed and that this ED will see/treat patient should they arrive.      Shasha De La O MD  Emergency Medicine  [facility] EMERGENCY DEPARTMENT  [address] [place] [id number]  [phone]          Shasha De La O MD  09/17/21 1040

## 2021-09-20 LAB
PATH REPORT.COMMENTS IMP SPEC: NORMAL
PATH REPORT.COMMENTS IMP SPEC: NORMAL
PATH REPORT.FINAL DX SPEC: NORMAL
PATH REPORT.GROSS SPEC: NORMAL
PATH REPORT.MICROSCOPIC SPEC OTHER STN: NORMAL
PATH REPORT.RELEVANT HX SPEC: NORMAL
PHOTO IMAGE: NORMAL

## 2021-09-20 PROCEDURE — 88304 TISSUE EXAM BY PATHOLOGIST: CPT | Mod: 26 | Performed by: PATHOLOGY

## 2021-09-21 ENCOUNTER — TELEPHONE (OUTPATIENT)
Dept: SURGERY | Facility: CLINIC | Age: 31
End: 2021-09-21

## 2021-09-21 NOTE — TELEPHONE ENCOUNTER
I returned the call to pt to discuss management with constipation. She will increase her fluid intake and begin use of Ducolax. She states this has worked well in the past. Her pain is well managed with Ibuprofen. She has not needed to take Tramadol for 2 days.     Discussed signs and symptoms of infection and dressing care. She had no further concerns at this time.

## 2021-09-21 NOTE — TELEPHONE ENCOUNTER
Pt had a lap appy on 9/17 with Dr. Cervantes. She has been having constipation for the last few days and is wondering if there is something she can take. She can be reached at [phone].

## 2021-10-01 ENCOUNTER — TELEPHONE (OUTPATIENT)
Dept: SURGERY | Facility: CLINIC | Age: 31
End: 2021-10-01

## 2021-10-01 NOTE — TELEPHONE ENCOUNTER
Patient had surgery about two weeks ago and still has pain on left side feels like its by the surgery site sometimes and other times a little lower.  She went back to work and not sure if she over did it and did something.    Please call patient and advise.    Thanks!

## 2021-10-01 NOTE — TELEPHONE ENCOUNTER
I spoke with Cynthia. She is complaining about left lower abdominal pain (near pelvis) that travels around her back. Her surgical incisions are healing great with no signs of infection. She has no nausea or vomiting. Denies fever/chills. She reports having an ovarian cyst in the past and it's a similar pain. The pt also reports that she has been very active since the day after surgery and went back to work as a . She also has a young child that she has been lifting (40 lb). Discussed that her pain could be from muscle she has aggravated with not much rest after surgery. She is actually on her way in to the [facility] to have her symptoms checked out. I will follow up with her on Monday to see if a follow up in our office is needed as well.

## 2021-10-02 ENCOUNTER — HEALTH MAINTENANCE LETTER (OUTPATIENT)
Age: 31
End: 2021-10-02

## 2022-03-27 ENCOUNTER — HOSPITAL ENCOUNTER (EMERGENCY)
Facility: CLINIC | Age: 32
Discharge: HOME | End: 2022-03-27
Attending: EMERGENCY MEDICINE | Admitting: EMERGENCY MEDICINE
Payer: COMMERCIAL

## 2022-03-27 VITALS
RESPIRATION RATE: 16 BRPM | WEIGHT: 150 LBS | TEMPERATURE: 98.8 F | SYSTOLIC BLOOD PRESSURE: 147 MMHG | HEART RATE: 90 BPM | DIASTOLIC BLOOD PRESSURE: 96 MMHG | BODY MASS INDEX: 25.75 KG/M2 | OXYGEN SATURATION: 100 %

## 2022-03-27 DIAGNOSIS — H10.33 ACUTE CONJUNCTIVITIS OF BOTH EYES, UNSPECIFIED ACUTE CONJUNCTIVITIS TYPE: ICD-10-CM

## 2022-03-27 PROCEDURE — 99283 EMERGENCY DEPT VISIT LOW MDM: CPT

## 2022-03-27 RX ORDER — TETRACAINE HYDROCHLORIDE 5 MG/ML
1-2 SOLUTION OPHTHALMIC ONCE
Status: DISCONTINUED | OUTPATIENT
Start: 2022-03-27 | End: 2022-03-28 | Stop reason: HOSPADM

## 2022-03-27 RX ORDER — POLYMYXIN B SULFATE AND TRIMETHOPRIM 1; 10000 MG/ML; [USP'U]/ML
1-2 SOLUTION OPHTHALMIC EVERY 6 HOURS
Qty: 3 ML | Refills: 0 | Status: SHIPPED | OUTPATIENT
Start: 2022-03-27 | End: 2022-04-03

## 2022-03-27 ASSESSMENT — ENCOUNTER SYMPTOMS
COUGH: 1
EYE PAIN: 1

## 2022-03-27 ASSESSMENT — VISUAL ACUITY: OU: 1

## 2022-03-28 NOTE — ED PROVIDER NOTES
EMERGENCY DEPARTMENT ENCOUNTER      NAME: Cynthia Minre  AGE: 31 year old female  YOB: 1990  MRN: [id number]  EVALUATION DATE & TIME: 3/27/2022 10:06 PM    PCP: [facility]    ED PROVIDER: Kenneth Stewart M.D.      Chief Complaint   Patient presents with     Conjunctivitis         FINAL IMPRESSION:  1. Acute conjunctivitis of both eyes, unspecified acute conjunctivitis type          ED COURSE & MEDICAL DECISION MAKING:    Pertinent Labs & Imaging studies reviewed. (See chart for details)  31 year old female presents to the Emergency Department for evaluation of bilateral eye pain.  Seems likely viral conjunctivitis.  Patient has some fluid in her ears and has had cold symptoms.  Slit lamp exam does not show any dendrites or signs of herpes keratitis.  No signs of corneal abrasion.  We will give her Polytrim for her conjunctivitis.  We will have her follow-up with her primary.  I do think is likely viral however she needs the antibiotics to go back to work.    10:16 PM I met with the patient to gather history and to perform my initial exam. I discussed the plan for care while in the Emergency Department. PPE: gloves, surgical mask, and eye protection.  10:29 PM We discussed the plan for discharge and the patient is agreeable. Reviewed supportive cares, symptomatic treatment, outpatient follow up, and reasons to return to the Emergency Department. Patient to be discharged by ED RN.     At the conclusion of the encounter I discussed the results of all of the tests and the disposition. The questions were answered. The patient or family acknowledged understanding and was agreeable with the care plan.           MEDICATIONS GIVEN IN THE EMERGENCY:  Medications   tetracaine (PONTOCAINE) 0.5 % ophthalmic solution 1-2 drop (has no administration in time range)   fluorescein (FUL-RACHEL) ophthalmic strip 2 strip (has no administration in time range)       NEW PRESCRIPTIONS STARTED AT TODAY'S  ER VISIT  New Prescriptions    TRIMETHOPRIM-POLYMYXIN B (POLYTRIM) 22004-2.1 UNIT/ML-% OPHTHALMIC SOLUTION    Place 1-2 drops into both eyes every 6 hours for 7 days          =================================================================    HPI    Patient information was obtained from: Patient    Use of : N/A       Cynthia Miner is a 31 year old female with a pertinent history of GDM, asthma, substance abuse, who presents to this ED via walk-in for evaluation of eye pain.    Patient reports burning sensation to bilateral eyes that started yesterday. She states she has a cold sore in her mouth and touched that then touched her eyes prior to onset of symptoms. Patient reports associating cough and tinnitus in right ear. Otherwise, she denies any other chemical exposures and use of glasses or contacts. She notes she takes Phentermine regularly. No allergies to medications. No recent exposures to tanning beds. Patient works in a restaurant. No other complaints at this time.    REVIEW OF SYSTEMS   Review of Systems   HENT: Positive for tinnitus (right ear).    Eyes: Positive for pain (bilateral).   Respiratory: Positive for cough.    All other systems reviewed and are negative.       PAST MEDICAL HISTORY:  Past Medical History:   Diagnosis Date     Abnormal Pap smear of cervix 2012    LEEP procedure     ASCUS (atypical squamous cells of undetermined significance) on Pap smear      High-risk pregnancy supervision      Hypertension      Obesity (BMI 30-39.9)      Other allergy, other than to medicinal agents     Created by Conversion      Short cervix        PAST SURGICAL HISTORY:  Past Surgical History:   Procedure Laterality Date     BIOPSY CERVICAL, LOCAL EXCISION, SINGLE/MULTIPLE       FOOT SURGERY Bilateral 2015    Removed bone tissue on lateral side of both feet     LAPAROSCOPIC APPENDECTOMY N/A 9/17/2021    Procedure: APPENDECTOMY, LAPAROSCOPIC;  Surgeon: Cathryn Cervantes DO;  Location: [facility]  OR     TUBAL LIGATION       WISDOM TOOTH EXTRACTION             CURRENT MEDICATIONS:    Current Facility-Administered Medications   Medication     fluorescein (FUL-RACHEL) ophthalmic strip 2 strip     tetracaine (PONTOCAINE) 0.5 % ophthalmic solution 1-2 drop     Current Outpatient Medications   Medication     trimethoprim-polymyxin b (POLYTRIM) 67386-6.1 UNIT/ML-% ophthalmic solution     ibuprofen (ADVIL,MOTRIN) 400 MG tablet     NO ACTIVE MEDICATIONS         ALLERGIES:  Allergies   Allergen Reactions     Other Environmental Allergy Unknown     Adhesive tape     Adhesive [Mecrylate] Rash     Reglan [Metoclopramide] Anxiety     Pt dislikes this medication and how it makes her feel. Felt weird, legs were heavy.       FAMILY HISTORY:  Family History   Problem Relation Age of Onset     Ovarian cysts Sister        SOCIAL HISTORY:   Social History     Socioeconomic History     Marital status: Single     Spouse name: Not on file     Number of children: Not on file     Years of education: Not on file     Highest education level: Not on file   Occupational History     Not on file   Tobacco Use     Smoking status: Current Every Day Smoker     Packs/day: 1.00     Years: 7.00     Pack years: 7.00     Types: Cigarettes     Smokeless tobacco: Never Used   Substance and Sexual Activity     Alcohol use: No     Drug use: No     Sexual activity: Never     Partners: Female     Birth control/protection: Abstinence   Other Topics Concern     Parent/sibling w/ CABG, MI or angioplasty before 65F 55M? Not Asked   Social History Narrative     Not on file     Social Determinants of Health     Financial Resource Strain: Not on file   Food Insecurity: Not on file   Transportation Needs: Not on file   Physical Activity: Not on file   Stress: Not on file   Social Connections: Not on file   Intimate Partner Violence: Not on file   Housing Stability: Not on file       VITALS:  BP (!) 147/96   Pulse 90   Temp 98.8  F (37.1  C) (Oral)   Resp 16    Wt 68 kg (150 lb)   LMP 03/27/2022   SpO2 100%   BMI 25.75 kg/m      PHYSICAL EXAM    Physical Exam  Constitutional:       General: She is not in acute distress.     Appearance: She is well-developed. She is not diaphoretic.   HENT:      Head: Normocephalic and atraumatic.   Eyes:      General: Lids are normal. Lids are everted, no foreign bodies appreciated. Vision grossly intact. No scleral icterus.        Right eye: No foreign body or discharge.         Left eye: No foreign body or discharge.      Extraocular Movements: Extraocular movements intact.      Right eye: Normal extraocular motion.      Left eye: Normal extraocular motion.      Conjunctiva/sclera:      Right eye: Right conjunctiva is injected. No exudate or hemorrhage.     Left eye: Left conjunctiva is injected. No exudate or hemorrhage.     Pupils: Pupils are equal, round, and reactive to light.      Right eye: No corneal abrasion or fluorescein uptake.      Left eye: No corneal abrasion or fluorescein uptake.      Slit lamp exam:     Right eye: Anterior chamber quiet. No corneal flare or corneal ulcer.      Left eye: Anterior chamber quiet. No corneal flare or corneal ulcer.   Cardiovascular:      Rate and Rhythm: Normal rate and regular rhythm.      Pulses: Normal pulses.      Heart sounds: Normal heart sounds.   Musculoskeletal:      Cervical back: Normal range of motion and neck supple.   Skin:     General: Skin is warm and dry.      Coloration: Skin is not pale.      Findings: No erythema or rash.   Neurological:      Mental Status: She is alert and oriented to person, place, and time.           LAB:  All pertinent labs reviewed and interpreted.  Labs Ordered and Resulted from Time of ED Arrival to Time of ED Departure - No data to display    RADIOLOGY:  Reviewed all pertinent imaging. Please see official radiology report.  No orders to display       PROCEDURES:  PROCEDURE: SLIT LAMP   INDICATIONS: Eye pain   PROCEDURE PROVIDER: Dr. Stewart    SITE:  bilateral   MEDICATION: tetracaine was applied by this physician to bilateral eyes.     NOTE:  exam:     No obvious uptake.  No dendrites.  No foreign bodies.  No anterior flare.     COMPLICATIONS:  None       I, Nola Hodges, am serving as a scribe to document services personally performed by Dr. Kenneth Stewart, based on my observation and the provider's statements to me. I, Kenneth Stewart MD attest that Nola Hodges is acting in a scribe capacity, has observed my performance of the services and has documented them in accordance with my direction.    Kenneth Stewart M.D.  Emergency Medicine  [facility] EMERGENCY ROOM  [address] [id number] [phone]  Dept: [phone]     Kenneth Stewart MD  03/27/22 1498

## 2022-03-28 NOTE — ED TRIAGE NOTES
Pt has burning bilat eyes. Discharge bilat eyes. Started yesterday. Blurry vision. Reports that looks cloudy or foggy. Denies light headedness nor dizziness. Denies nausea.

## 2022-07-09 ENCOUNTER — HEALTH MAINTENANCE LETTER (OUTPATIENT)
Age: 32
End: 2022-07-09

## 2022-09-03 ENCOUNTER — HEALTH MAINTENANCE LETTER (OUTPATIENT)
Age: 32
End: 2022-09-03

## 2022-11-15 ENCOUNTER — NURSE TRIAGE (OUTPATIENT)
Dept: NURSING | Facility: CLINIC | Age: 32
End: 2022-11-15

## 2022-11-15 NOTE — TELEPHONE ENCOUNTER
"Triage Call    30 yo pt calling to report a chronic problem with hand and wrist pain and tingling she has had for 1-2 years but is getting worse.    Says the pain and tingling in her Rt hand fingers and wrist is the worst, but also radiates up to her shoulder.  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    Triaged to disposition See PCP within 3 days, and Care Advice given per Hand and Wrist pain Guideline.    Felicity Hand RN      Reason for Disposition    Wrist pain is main symptom    [1] Weakness or numbness in hand or fingers AND [2] present > 2 weeks    Additional Information    Negative: Shock suspected (e.g., cold/pale/clammy skin, too weak to stand, low BP, rapid pulse)    Negative: [1] Similar pain previously AND [2] it was from \"heart attack\"    Negative: [1] Similar pain previously AND [2] it was from \"angina\" AND [3] not relieved by nitroglycerin    Negative: Sounds like a life-threatening emergency to the triager    Negative: Elbow pain is main symptom    Negative: Wound looks infected    Negative: Pain, redness, or swelling at intravenous (IV) site or along course of vein    Negative: Chest pain    Negative: Followed an arm injury    Negative: [1] Similar pain previously AND [2] it was from \"heart attack\"    Negative: [1] Similar pain previously AND [2] it was from \"angina\" AND [3] not relieved by nitroglycerin    Negative: Sounds like a life-threatening emergency to the triager    Negative: Followed a hand or wrist injury    Negative: Chest pain    Negative: Caused by an animal bite    Negative: Caused by a human bite    Negative: Wound looks infected    Negative: Finger pain is main symptom    Negative: Arm pain is main symptom    Negative: [1] Swollen joint AND [2] fever    Negative: [1] Red area or streak AND [2] fever    Negative: Patient sounds very sick or weak to the triager    Negative: [1] SEVERE pain (e.g., excruciating, unable to use hand at all) " AND [2] not improved after 2 hours of pain medicine    Negative: [1] Looks infected (spreading redness, pus) AND [2] large red area (> 2 in. or 5 cm)    Negative: Weakness (i.e., loss of strength) of new-onset in hand or fingers  (Exceptions: not truly weak, hand feels weak because of pain; weakness present > 2 weeks)    Negative: [1] MODERATE pain (e.g., interferes with normal activities) AND [2] present > 3 days    Negative: Looks like a boil, infected sore, deep ulcer or other infected rash (spreading redness, pus)    Negative: [1] Localized rash is very painful AND [2] no fever    Negative: Numbness (i.e., loss of sensation) in hand or fingers (Exception: just tingling; numbness present > 2 weeks)    Protocols used: ARM PAIN-A-AH, HAND AND WRIST PAIN-A-AH

## 2023-06-19 ENCOUNTER — NURSE TRIAGE (OUTPATIENT)
Dept: NURSING | Facility: CLINIC | Age: 33
End: 2023-06-19
Payer: COMMERCIAL
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
## 2023-07-22 ENCOUNTER — HEALTH MAINTENANCE LETTER (OUTPATIENT)
Age: 33
End: 2023-07-22

## 2023-09-08 ENCOUNTER — HOSPITAL ENCOUNTER (EMERGENCY)
Facility: CLINIC | Age: 33
Discharge: LEFT BEFORE DISCHARGE | End: 2023-09-08
Payer: COMMERCIAL

## 2024-09-14 ENCOUNTER — HEALTH MAINTENANCE LETTER (OUTPATIENT)
Age: 34
End: 2024-09-14

## (undated) DEVICE — STPL ENDO LINEAR CUT ARTICULATING 45MM ATS45

## (undated) DEVICE — SU VICRYL+ 0 54 UNDYED VCP608H

## (undated) DEVICE — DECANTER VIAL 2006S

## (undated) DEVICE — DRESSING COVERLET 2 X 3 0340

## (undated) DEVICE — PLATE GROUNDING ADULT W/CORD 9165L

## (undated) DEVICE — CATH TRAY FOLEY SURESTEP 16FR DRAIN BAG STATOCK A899916

## (undated) DEVICE — ENDO POUCH UNIV RETRIEVAL SYSTEM INZII 10MM CD001

## (undated) DEVICE — STPL ENDO RELOAD 45X2.5MM VASC TR45W

## (undated) DEVICE — GOWN IMPERVIOUS BREATHABLE SMART LG 89015

## (undated) DEVICE — ENDO TROCAR FIRST ENTRY KII FIOS Z-THRD 12X100MM CTF73

## (undated) DEVICE — MITT PRE-OP 7 L X 5 1/2 W 5177M1

## (undated) DEVICE — DRSG STERI STRIP 1/2X4" R1547

## (undated) DEVICE — SUTURE VICRYL+ 4-0 UNDYED PS-2 VCP496H

## (undated) DEVICE — CUSTOM PACK LAP CHOLE SBA5BLCHEA

## (undated) DEVICE — SOL RINGERS LACTATED 1000ML BAG 2B2324X

## (undated) DEVICE — Device

## (undated) DEVICE — ENDO TROCAR OPTICAL ACCESS KII Z-THRD 05X100MM CTR03

## (undated) DEVICE — GLOVE UNDER INDICATOR PI SZ 7.0 LF 41670

## (undated) DEVICE — PREP DURAPREP 26ML APL 8630

## (undated) DEVICE — BASIN EMESIS STERILE  SSK9005A

## (undated) DEVICE — SOL ADH LIQUID BENZOIN SWAB 0.6ML C1544

## (undated) DEVICE — BLADE KNIFE SURG 11 371111

## (undated) DEVICE — TUBING SMOKE EVAC PNEUMOCLEAR HIGH FLOW 0620050250

## (undated) DEVICE — ENDO TROCAR SLEEVE KII Z-THREADED 05X100MM CTS02

## (undated) DEVICE — SOL WATER IRRIG 1000ML BOTTLE 2F7114

## (undated) DEVICE — TUBING LAP SUCT/IRRIG STRYKER 250070500

## (undated) DEVICE — SU VICRYL+ 0 27 UR6 VLT VCP603H

## (undated) DEVICE — GLOVE BIOGEL PI ULTRATOUCH G SZ 6.0 42160

## (undated) DEVICE — BLADE CLIPPER PIVOT PURPLE DISP 9660

## (undated) DEVICE — GLOVE UNDER INDICATOR PI SZ 6.5 LF 41665

## (undated) DEVICE — SUCTION MANIFOLD NEPTUNE 2 SYS 1 PORT 702-025-000

## (undated) DEVICE — STPL ENDO RELOAD 45X3.5MM 6R45B